# Patient Record
Sex: MALE | Race: BLACK OR AFRICAN AMERICAN | Employment: FULL TIME | URBAN - NONMETROPOLITAN AREA
[De-identification: names, ages, dates, MRNs, and addresses within clinical notes are randomized per-mention and may not be internally consistent; named-entity substitution may affect disease eponyms.]

---

## 2017-01-10 ENCOUNTER — OFFICE VISIT (OUTPATIENT)
Dept: CARDIOLOGY | Age: 47
End: 2017-01-10

## 2017-01-10 ENCOUNTER — PROCEDURE VISIT (OUTPATIENT)
Dept: CARDIOLOGY | Age: 47
End: 2017-01-10

## 2017-01-10 VITALS
SYSTOLIC BLOOD PRESSURE: 142 MMHG | WEIGHT: 222 LBS | HEIGHT: 70 IN | DIASTOLIC BLOOD PRESSURE: 78 MMHG | BODY MASS INDEX: 31.78 KG/M2 | HEART RATE: 93 BPM

## 2017-01-10 DIAGNOSIS — Z01.818 PRE-OP EVALUATION: ICD-10-CM

## 2017-01-10 DIAGNOSIS — E78.5 DYSLIPIDEMIA: ICD-10-CM

## 2017-01-10 DIAGNOSIS — Z95.810 S/P ICD (INTERNAL CARDIAC DEFIBRILLATOR) PROCEDURE: ICD-10-CM

## 2017-01-10 DIAGNOSIS — Z72.0 TOBACCO ABUSE: ICD-10-CM

## 2017-01-10 DIAGNOSIS — R06.02 SOB (SHORTNESS OF BREATH) ON EXERTION: ICD-10-CM

## 2017-01-10 DIAGNOSIS — I25.10 CORONARY ARTERY DISEASE INVOLVING NATIVE CORONARY ARTERY OF NATIVE HEART, ANGINA PRESENCE UNSPECIFIED: ICD-10-CM

## 2017-01-10 DIAGNOSIS — Z95.810 S/P ICD (INTERNAL CARDIAC DEFIBRILLATOR) PROCEDURE: Primary | ICD-10-CM

## 2017-01-10 DIAGNOSIS — I25.5 ISCHEMIC CARDIOMYOPATHY: ICD-10-CM

## 2017-01-10 DIAGNOSIS — R07.9 CHEST PAIN AT REST: Primary | ICD-10-CM

## 2017-01-10 PROCEDURE — 93282 PRGRMG EVAL IMPLANTABLE DFB: CPT | Performed by: INTERNAL MEDICINE

## 2017-01-10 PROCEDURE — 93000 ELECTROCARDIOGRAM COMPLETE: CPT | Performed by: INTERNAL MEDICINE

## 2017-01-10 PROCEDURE — 99215 OFFICE O/P EST HI 40 MIN: CPT | Performed by: INTERNAL MEDICINE

## 2017-01-10 RX ORDER — METOPROLOL SUCCINATE 50 MG/1
50 TABLET, EXTENDED RELEASE ORAL 2 TIMES DAILY
Qty: 60 TABLET | Refills: 3 | Status: SHIPPED | OUTPATIENT
Start: 2017-01-10 | End: 2017-01-12

## 2017-01-10 RX ORDER — LISINOPRIL 10 MG/1
10 TABLET ORAL DAILY
Qty: 30 TABLET | Refills: 6 | Status: SHIPPED | OUTPATIENT
Start: 2017-01-10 | End: 2017-06-26 | Stop reason: SDUPTHER

## 2017-01-10 RX ORDER — PRASUGREL 10 MG/1
10 TABLET, FILM COATED ORAL DAILY
Qty: 30 TABLET | Refills: 6 | Status: SHIPPED | OUTPATIENT
Start: 2017-01-10 | End: 2017-09-15 | Stop reason: SDUPTHER

## 2017-01-10 RX ORDER — ISOSORBIDE MONONITRATE 60 MG/1
60 TABLET, EXTENDED RELEASE ORAL DAILY
Qty: 30 TABLET | Refills: 3 | Status: SHIPPED | OUTPATIENT
Start: 2017-01-10 | End: 2017-06-26 | Stop reason: ALTCHOICE

## 2017-01-12 RX ORDER — METOPROLOL TARTRATE 50 MG/1
50 TABLET, FILM COATED ORAL 2 TIMES DAILY
Qty: 60 TABLET | Refills: 0 | Status: SHIPPED | OUTPATIENT
Start: 2017-01-12 | End: 2017-06-26 | Stop reason: SDUPTHER

## 2017-01-12 RX ORDER — METOPROLOL TARTRATE 50 MG/1
50 TABLET, FILM COATED ORAL 2 TIMES DAILY
COMMUNITY
End: 2017-01-12 | Stop reason: SDUPTHER

## 2017-01-30 ENCOUNTER — OFFICE VISIT (OUTPATIENT)
Dept: CARDIOLOGY | Age: 47
End: 2017-01-30

## 2017-01-30 VITALS
DIASTOLIC BLOOD PRESSURE: 66 MMHG | SYSTOLIC BLOOD PRESSURE: 104 MMHG | WEIGHT: 220 LBS | BODY MASS INDEX: 32.58 KG/M2 | HEIGHT: 69 IN | HEART RATE: 80 BPM

## 2017-01-30 DIAGNOSIS — E78.5 DYSLIPIDEMIA: ICD-10-CM

## 2017-01-30 DIAGNOSIS — I25.5 ISCHEMIC CARDIOMYOPATHY: ICD-10-CM

## 2017-01-30 DIAGNOSIS — Z72.0 TOBACCO ABUSE: ICD-10-CM

## 2017-01-30 DIAGNOSIS — I25.10 CORONARY ARTERY DISEASE INVOLVING NATIVE CORONARY ARTERY OF NATIVE HEART, ANGINA PRESENCE UNSPECIFIED: ICD-10-CM

## 2017-01-30 DIAGNOSIS — R07.9 CHEST PAIN AT REST: Primary | ICD-10-CM

## 2017-01-30 DIAGNOSIS — Z01.818 PRE-OP EVALUATION: ICD-10-CM

## 2017-01-30 DIAGNOSIS — R06.02 SOB (SHORTNESS OF BREATH) ON EXERTION: ICD-10-CM

## 2017-01-30 DIAGNOSIS — Z95.810 S/P ICD (INTERNAL CARDIAC DEFIBRILLATOR) PROCEDURE: ICD-10-CM

## 2017-01-30 PROCEDURE — 99213 OFFICE O/P EST LOW 20 MIN: CPT | Performed by: INTERNAL MEDICINE

## 2017-02-14 ENCOUNTER — TELEPHONE (OUTPATIENT)
Dept: CARDIOLOGY | Age: 47
End: 2017-02-14

## 2017-02-22 ENCOUNTER — TELEPHONE (OUTPATIENT)
Dept: CARDIOLOGY | Age: 47
End: 2017-02-22

## 2017-03-03 ENCOUNTER — TELEPHONE (OUTPATIENT)
Dept: CARDIOLOGY | Age: 47
End: 2017-03-03

## 2017-03-17 ENCOUNTER — PROCEDURE VISIT (OUTPATIENT)
Dept: CARDIOLOGY | Age: 47
End: 2017-03-17

## 2017-03-17 DIAGNOSIS — I25.5 ISCHEMIC CARDIOMYOPATHY: Primary | ICD-10-CM

## 2017-03-17 DIAGNOSIS — Z95.810 S/P ICD (INTERNAL CARDIAC DEFIBRILLATOR) PROCEDURE: ICD-10-CM

## 2017-03-17 PROCEDURE — 93297 REM INTERROG DEV EVAL ICPMS: CPT | Performed by: INTERNAL MEDICINE

## 2017-05-03 ENCOUNTER — TELEPHONE (OUTPATIENT)
Dept: CARDIOLOGY | Age: 47
End: 2017-05-03

## 2017-05-16 ENCOUNTER — PROCEDURE VISIT (OUTPATIENT)
Dept: CARDIOLOGY | Age: 47
End: 2017-05-16

## 2017-05-16 DIAGNOSIS — Z95.810 S/P ICD (INTERNAL CARDIAC DEFIBRILLATOR) PROCEDURE: Primary | ICD-10-CM

## 2017-05-16 PROCEDURE — 93295 DEV INTERROG REMOTE 1/2/MLT: CPT | Performed by: INTERNAL MEDICINE

## 2017-05-16 PROCEDURE — 93296 REM INTERROG EVL PM/IDS: CPT | Performed by: INTERNAL MEDICINE

## 2017-06-12 ENCOUNTER — PROCEDURE VISIT (OUTPATIENT)
Dept: CARDIOLOGY | Age: 47
End: 2017-06-12

## 2017-06-12 DIAGNOSIS — Z95.810 S/P ICD (INTERNAL CARDIAC DEFIBRILLATOR) PROCEDURE: Primary | ICD-10-CM

## 2017-06-26 ENCOUNTER — OFFICE VISIT (OUTPATIENT)
Dept: CARDIOLOGY | Age: 47
End: 2017-06-26

## 2017-06-26 VITALS
WEIGHT: 215.8 LBS | DIASTOLIC BLOOD PRESSURE: 78 MMHG | HEIGHT: 70 IN | SYSTOLIC BLOOD PRESSURE: 126 MMHG | BODY MASS INDEX: 30.9 KG/M2 | HEART RATE: 78 BPM

## 2017-06-26 DIAGNOSIS — R06.02 SOB (SHORTNESS OF BREATH) ON EXERTION: ICD-10-CM

## 2017-06-26 DIAGNOSIS — I25.10 CORONARY ARTERY DISEASE INVOLVING NATIVE CORONARY ARTERY OF NATIVE HEART, ANGINA PRESENCE UNSPECIFIED: Primary | ICD-10-CM

## 2017-06-26 DIAGNOSIS — R07.9 CHEST PAIN AT REST: ICD-10-CM

## 2017-06-26 DIAGNOSIS — Z98.890 S/P CARDIAC CATH: ICD-10-CM

## 2017-06-26 DIAGNOSIS — I25.5 ISCHEMIC CARDIOMYOPATHY: ICD-10-CM

## 2017-06-26 DIAGNOSIS — I20.8 STABLE ANGINA (HCC): ICD-10-CM

## 2017-06-26 DIAGNOSIS — Z95.810 S/P ICD (INTERNAL CARDIAC DEFIBRILLATOR) PROCEDURE: ICD-10-CM

## 2017-06-26 PROBLEM — I20.89 STABLE ANGINA: Status: ACTIVE | Noted: 2017-06-26

## 2017-06-26 PROCEDURE — 99214 OFFICE O/P EST MOD 30 MIN: CPT | Performed by: INTERNAL MEDICINE

## 2017-06-26 RX ORDER — ISOSORBIDE MONONITRATE 30 MG/1
30 TABLET, EXTENDED RELEASE ORAL DAILY
COMMUNITY
End: 2017-06-26

## 2017-06-26 RX ORDER — RANOLAZINE 500 MG/1
500 TABLET, EXTENDED RELEASE ORAL 2 TIMES DAILY
Qty: 180 TABLET | Refills: 1 | Status: SHIPPED | OUTPATIENT
Start: 2017-06-26 | End: 2017-09-15 | Stop reason: SDUPTHER

## 2017-06-26 RX ORDER — TRIAMTERENE AND HYDROCHLOROTHIAZIDE 37.5; 25 MG/1; MG/1
1 TABLET ORAL EVERY MORNING
COMMUNITY
End: 2017-06-26

## 2017-06-26 RX ORDER — METOPROLOL TARTRATE 50 MG/1
50 TABLET, FILM COATED ORAL 2 TIMES DAILY
Qty: 180 TABLET | Refills: 1 | Status: SHIPPED | OUTPATIENT
Start: 2017-06-26 | End: 2017-09-15 | Stop reason: SDUPTHER

## 2017-06-26 RX ORDER — ISOSORBIDE MONONITRATE 120 MG/1
120 TABLET, EXTENDED RELEASE ORAL DAILY
Qty: 90 TABLET | Refills: 2 | Status: SHIPPED | OUTPATIENT
Start: 2017-06-26 | End: 2017-09-15 | Stop reason: SDUPTHER

## 2017-06-26 RX ORDER — OMEPRAZOLE 40 MG/1
40 CAPSULE, DELAYED RELEASE ORAL DAILY
Status: ON HOLD | COMMUNITY
End: 2018-08-21

## 2017-06-26 RX ORDER — LISINOPRIL 10 MG/1
10 TABLET ORAL DAILY
Qty: 90 TABLET | Refills: 1 | Status: SHIPPED | OUTPATIENT
Start: 2017-06-26 | End: 2017-09-15 | Stop reason: SDUPTHER

## 2017-06-26 RX ORDER — PANTOPRAZOLE SODIUM 40 MG/1
40 TABLET, DELAYED RELEASE ORAL DAILY
Qty: 90 TABLET | Refills: 1 | Status: SHIPPED | OUTPATIENT
Start: 2017-06-26 | End: 2017-09-15 | Stop reason: SDUPTHER

## 2017-06-26 RX ORDER — NAPROXEN 500 MG/1
500 TABLET ORAL 2 TIMES DAILY PRN
Status: ON HOLD | COMMUNITY
End: 2018-08-20

## 2017-07-06 ENCOUNTER — TELEPHONE (OUTPATIENT)
Dept: CARDIOLOGY | Age: 47
End: 2017-07-06

## 2017-07-06 ENCOUNTER — PROCEDURE VISIT (OUTPATIENT)
Dept: CARDIOLOGY | Age: 47
End: 2017-07-06

## 2017-07-06 DIAGNOSIS — I25.5 ISCHEMIC CARDIOMYOPATHY: Primary | ICD-10-CM

## 2017-07-06 PROCEDURE — 93297 REM INTERROG DEV EVAL ICPMS: CPT | Performed by: INTERNAL MEDICINE

## 2017-07-17 ENCOUNTER — OFFICE VISIT (OUTPATIENT)
Dept: CARDIOLOGY CLINIC | Age: 47
End: 2017-07-17
Payer: MEDICAID

## 2017-07-17 VITALS
BODY MASS INDEX: 31.07 KG/M2 | SYSTOLIC BLOOD PRESSURE: 124 MMHG | HEART RATE: 88 BPM | HEIGHT: 70 IN | DIASTOLIC BLOOD PRESSURE: 76 MMHG | WEIGHT: 217 LBS

## 2017-07-17 DIAGNOSIS — Z98.890 S/P CARDIAC CATH: ICD-10-CM

## 2017-07-17 DIAGNOSIS — Z95.810 S/P ICD (INTERNAL CARDIAC DEFIBRILLATOR) PROCEDURE: ICD-10-CM

## 2017-07-17 DIAGNOSIS — E78.5 DYSLIPIDEMIA: ICD-10-CM

## 2017-07-17 DIAGNOSIS — R06.02 SOB (SHORTNESS OF BREATH) ON EXERTION: ICD-10-CM

## 2017-07-17 DIAGNOSIS — I25.10 CORONARY ARTERY DISEASE INVOLVING NATIVE CORONARY ARTERY OF NATIVE HEART, ANGINA PRESENCE UNSPECIFIED: Primary | ICD-10-CM

## 2017-07-17 DIAGNOSIS — I25.5 ISCHEMIC CARDIOMYOPATHY: ICD-10-CM

## 2017-07-17 PROCEDURE — 99213 OFFICE O/P EST LOW 20 MIN: CPT | Performed by: INTERNAL MEDICINE

## 2017-08-11 ENCOUNTER — PROCEDURE VISIT (OUTPATIENT)
Dept: CARDIOLOGY CLINIC | Age: 47
End: 2017-08-11

## 2017-08-11 DIAGNOSIS — Z95.810 S/P ICD (INTERNAL CARDIAC DEFIBRILLATOR) PROCEDURE: Primary | ICD-10-CM

## 2017-08-28 ENCOUNTER — TELEPHONE (OUTPATIENT)
Dept: CARDIOLOGY CLINIC | Age: 47
End: 2017-08-28

## 2017-08-28 DIAGNOSIS — I25.5 ISCHEMIC CARDIOMYOPATHY: Primary | ICD-10-CM

## 2017-09-01 ENCOUNTER — TELEPHONE (OUTPATIENT)
Dept: CARDIOLOGY CLINIC | Age: 47
End: 2017-09-01

## 2017-09-06 ENCOUNTER — HOSPITAL ENCOUNTER (OUTPATIENT)
Dept: NON INVASIVE DIAGNOSTICS | Age: 47
Discharge: HOME | End: 2017-09-06
Payer: MEDICAID

## 2017-09-06 DIAGNOSIS — I25.5 ISCHEMIC CARDIOMYOPATHY: ICD-10-CM

## 2017-09-06 PROCEDURE — 93307 TTE W/O DOPPLER COMPLETE: CPT

## 2017-09-15 ENCOUNTER — OFFICE VISIT (OUTPATIENT)
Dept: CARDIOLOGY CLINIC | Age: 47
End: 2017-09-15
Payer: MEDICAID

## 2017-09-15 ENCOUNTER — HOSPITAL ENCOUNTER (EMERGENCY)
Age: 47
Discharge: LEFT BEFORE DISCHARGE | End: 2017-09-15
Payer: MEDICAID

## 2017-09-15 ENCOUNTER — APPOINTMENT (OUTPATIENT)
Dept: CT IMAGING | Age: 47
End: 2017-09-15
Payer: MEDICAID

## 2017-09-15 VITALS
TEMPERATURE: 98.7 F | HEART RATE: 74 BPM | OXYGEN SATURATION: 99 % | RESPIRATION RATE: 18 BRPM | BODY MASS INDEX: 30.21 KG/M2 | HEIGHT: 70 IN | WEIGHT: 211 LBS | DIASTOLIC BLOOD PRESSURE: 97 MMHG | SYSTOLIC BLOOD PRESSURE: 141 MMHG

## 2017-09-15 VITALS
SYSTOLIC BLOOD PRESSURE: 130 MMHG | HEART RATE: 80 BPM | BODY MASS INDEX: 31.25 KG/M2 | WEIGHT: 211 LBS | HEIGHT: 69 IN | DIASTOLIC BLOOD PRESSURE: 82 MMHG

## 2017-09-15 DIAGNOSIS — R07.9 CHEST PAIN AT REST: ICD-10-CM

## 2017-09-15 DIAGNOSIS — I25.5 ISCHEMIC CARDIOMYOPATHY: ICD-10-CM

## 2017-09-15 DIAGNOSIS — E78.5 DYSLIPIDEMIA: ICD-10-CM

## 2017-09-15 DIAGNOSIS — I25.10 CORONARY ARTERY DISEASE INVOLVING NATIVE CORONARY ARTERY OF NATIVE HEART WITHOUT ANGINA PECTORIS: Primary | ICD-10-CM

## 2017-09-15 DIAGNOSIS — Z98.890 S/P CARDIAC CATH: ICD-10-CM

## 2017-09-15 DIAGNOSIS — R51.9 ACUTE NONINTRACTABLE HEADACHE, UNSPECIFIED HEADACHE TYPE: Primary | ICD-10-CM

## 2017-09-15 DIAGNOSIS — Z95.810 S/P ICD (INTERNAL CARDIAC DEFIBRILLATOR) PROCEDURE: ICD-10-CM

## 2017-09-15 DIAGNOSIS — R06.02 SOB (SHORTNESS OF BREATH) ON EXERTION: ICD-10-CM

## 2017-09-15 LAB
ANION GAP SERPL CALCULATED.3IONS-SCNC: 13 MEQ/L (ref 8–16)
BASOPHILS # BLD: 0.6 %
BASOPHILS ABSOLUTE: 0.1 THOU/MM3 (ref 0–0.1)
BUN BLDV-MCNC: 10 MG/DL (ref 7–22)
CALCIUM SERPL-MCNC: 10.4 MG/DL (ref 8.5–10.5)
CHLORIDE BLD-SCNC: 98 MEQ/L (ref 98–111)
CO2: 28 MEQ/L (ref 23–33)
CREAT SERPL-MCNC: 0.9 MG/DL (ref 0.4–1.2)
EOSINOPHIL # BLD: 0.7 %
EOSINOPHILS ABSOLUTE: 0.1 THOU/MM3 (ref 0–0.4)
GFR SERPL CREATININE-BSD FRML MDRD: > 90 ML/MIN/1.73M2
GLUCOSE BLD-MCNC: 166 MG/DL (ref 70–108)
HCT VFR BLD CALC: 45.9 % (ref 42–52)
HEMOGLOBIN: 15.7 GM/DL (ref 14–18)
LYMPHOCYTES # BLD: 39.5 %
LYMPHOCYTES ABSOLUTE: 4.9 THOU/MM3 (ref 1–4.8)
MCH RBC QN AUTO: 30.6 PG (ref 27–31)
MCHC RBC AUTO-ENTMCNC: 34.3 GM/DL (ref 33–37)
MCV RBC AUTO: 89.1 FL (ref 80–94)
MONOCYTES # BLD: 5.1 %
MONOCYTES ABSOLUTE: 0.6 THOU/MM3 (ref 0.4–1.3)
NUCLEATED RED BLOOD CELLS: 0 /100 WBC
OSMOLALITY CALCULATION: 280.3 MOSMOL/KG (ref 275–300)
PDW BLD-RTO: 14 % (ref 11.5–14.5)
PLATELET # BLD: 246 THOU/MM3 (ref 130–400)
PMV BLD AUTO: 8.7 MCM (ref 7.4–10.4)
POTASSIUM SERPL-SCNC: 5.2 MEQ/L (ref 3.5–5.2)
RBC # BLD: 5.15 MILL/MM3 (ref 4.7–6.1)
RBC # BLD: NORMAL 10*6/UL
SEG NEUTROPHILS: 54.1 %
SEGMENTED NEUTROPHILS ABSOLUTE COUNT: 6.8 THOU/MM3 (ref 1.8–7.7)
SODIUM BLD-SCNC: 139 MEQ/L (ref 135–145)
WBC # BLD: 12.5 THOU/MM3 (ref 4.8–10.8)

## 2017-09-15 PROCEDURE — 99284 EMERGENCY DEPT VISIT MOD MDM: CPT

## 2017-09-15 PROCEDURE — 80048 BASIC METABOLIC PNL TOTAL CA: CPT

## 2017-09-15 PROCEDURE — 36415 COLL VENOUS BLD VENIPUNCTURE: CPT

## 2017-09-15 PROCEDURE — 99215 OFFICE O/P EST HI 40 MIN: CPT | Performed by: INTERNAL MEDICINE

## 2017-09-15 PROCEDURE — 6370000000 HC RX 637 (ALT 250 FOR IP): Performed by: PHYSICIAN ASSISTANT

## 2017-09-15 PROCEDURE — 85025 COMPLETE CBC W/AUTO DIFF WBC: CPT

## 2017-09-15 RX ORDER — ASPIRIN 81 MG/1
81 TABLET ORAL DAILY
Qty: 90 TABLET | Refills: 3 | Status: SHIPPED | OUTPATIENT
Start: 2017-09-15

## 2017-09-15 RX ORDER — CYCLOBENZAPRINE HCL 10 MG
10 TABLET ORAL 3 TIMES DAILY PRN
Status: DISCONTINUED | OUTPATIENT
Start: 2017-09-15 | End: 2017-09-15 | Stop reason: HOSPADM

## 2017-09-15 RX ORDER — METOPROLOL TARTRATE 50 MG/1
50 TABLET, FILM COATED ORAL 2 TIMES DAILY
Qty: 180 TABLET | Refills: 3 | Status: ON HOLD | OUTPATIENT
Start: 2017-09-15 | End: 2018-08-21

## 2017-09-15 RX ORDER — PANTOPRAZOLE SODIUM 40 MG/1
40 TABLET, DELAYED RELEASE ORAL DAILY
Qty: 90 TABLET | Refills: 3 | Status: ON HOLD | OUTPATIENT
Start: 2017-09-15 | End: 2018-01-13 | Stop reason: HOSPADM

## 2017-09-15 RX ORDER — FLUTICASONE PROPIONATE 50 MCG
2 SPRAY, SUSPENSION (ML) NASAL DAILY
COMMUNITY
End: 2021-04-06

## 2017-09-15 RX ORDER — DIPHENHYDRAMINE HCL 25 MG
25 TABLET ORAL ONCE
Status: COMPLETED | OUTPATIENT
Start: 2017-09-15 | End: 2017-09-15

## 2017-09-15 RX ORDER — LISINOPRIL 5 MG/1
5 TABLET ORAL DAILY
Qty: 90 TABLET | Refills: 2 | Status: ON HOLD | OUTPATIENT
Start: 2017-09-15 | End: 2018-08-21

## 2017-09-15 RX ORDER — 0.9 % SODIUM CHLORIDE 0.9 %
1000 INTRAVENOUS SOLUTION INTRAVENOUS ONCE
Status: DISCONTINUED | OUTPATIENT
Start: 2017-09-15 | End: 2017-09-15 | Stop reason: HOSPADM

## 2017-09-15 RX ORDER — ACETAMINOPHEN 500 MG
1000 TABLET ORAL ONCE
Status: COMPLETED | OUTPATIENT
Start: 2017-09-15 | End: 2017-09-15

## 2017-09-15 RX ORDER — ISOSORBIDE MONONITRATE 120 MG/1
120 TABLET, EXTENDED RELEASE ORAL DAILY
Qty: 90 TABLET | Refills: 2 | Status: ON HOLD | OUTPATIENT
Start: 2017-09-15 | End: 2018-08-21 | Stop reason: HOSPADM

## 2017-09-15 RX ORDER — RANOLAZINE 1000 MG/1
1000 TABLET, EXTENDED RELEASE ORAL 2 TIMES DAILY
Qty: 90 TABLET | Refills: 1 | Status: ON HOLD | OUTPATIENT
Start: 2017-09-15 | End: 2018-08-21

## 2017-09-15 RX ORDER — TRIAMTERENE AND HYDROCHLOROTHIAZIDE 37.5; 25 MG/1; MG/1
0.5 TABLET ORAL DAILY
Qty: 45 TABLET | Refills: 1 | Status: ON HOLD | OUTPATIENT
Start: 2017-09-15 | End: 2018-08-21

## 2017-09-15 RX ORDER — PRASUGREL 10 MG/1
10 TABLET, FILM COATED ORAL DAILY
Qty: 90 TABLET | Refills: 3 | Status: ON HOLD | OUTPATIENT
Start: 2017-09-15 | End: 2018-08-21

## 2017-09-15 RX ORDER — ONDANSETRON 2 MG/ML
4 INJECTION INTRAMUSCULAR; INTRAVENOUS ONCE
Status: DISCONTINUED | OUTPATIENT
Start: 2017-09-15 | End: 2017-09-15 | Stop reason: HOSPADM

## 2017-09-15 RX ADMIN — DIPHENHYDRAMINE HCL 25 MG: 25 TABLET ORAL at 15:45

## 2017-09-15 RX ADMIN — CYCLOBENZAPRINE HYDROCHLORIDE 10 MG: 10 TABLET, FILM COATED ORAL at 15:45

## 2017-09-15 RX ADMIN — ACETAMINOPHEN 1000 MG: 500 TABLET ORAL at 15:45

## 2017-09-15 ASSESSMENT — ENCOUNTER SYMPTOMS
RHINORRHEA: 0
COUGH: 0
WHEEZING: 0
PHOTOPHOBIA: 0
SHORTNESS OF BREATH: 0
ABDOMINAL PAIN: 0
COLOR CHANGE: 0
SINUS PRESSURE: 0
BACK PAIN: 0
DIARRHEA: 0
NAUSEA: 0
CONSTIPATION: 0
SORE THROAT: 0
VOMITING: 0
EYE PAIN: 0

## 2017-09-15 ASSESSMENT — PAIN SCALES - GENERAL
PAINLEVEL_OUTOF10: 6
PAINLEVEL_OUTOF10: 10
PAINLEVEL_OUTOF10: 10

## 2017-09-15 ASSESSMENT — PAIN DESCRIPTION - LOCATION
LOCATION: HEAD
LOCATION: HEAD;NECK

## 2017-09-15 ASSESSMENT — PAIN DESCRIPTION - PAIN TYPE
TYPE: ACUTE PAIN
TYPE: ACUTE PAIN

## 2017-09-15 ASSESSMENT — PAIN DESCRIPTION - DESCRIPTORS
DESCRIPTORS: ACHING;THROBBING
DESCRIPTORS: ACHING

## 2017-09-15 ASSESSMENT — PAIN DESCRIPTION - PROGRESSION: CLINICAL_PROGRESSION: GRADUALLY IMPROVING

## 2017-09-19 ENCOUNTER — PROCEDURE VISIT (OUTPATIENT)
Dept: CARDIOLOGY CLINIC | Age: 47
End: 2017-09-19
Payer: MEDICAID

## 2017-09-19 DIAGNOSIS — Z95.810 S/P ICD (INTERNAL CARDIAC DEFIBRILLATOR) PROCEDURE: Primary | ICD-10-CM

## 2017-09-19 DIAGNOSIS — I25.5 ISCHEMIC CARDIOMYOPATHY: ICD-10-CM

## 2017-09-19 PROCEDURE — 93297 REM INTERROG DEV EVAL ICPMS: CPT | Performed by: INTERNAL MEDICINE

## 2017-09-25 ENCOUNTER — TELEPHONE (OUTPATIENT)
Dept: CARDIOLOGY CLINIC | Age: 47
End: 2017-09-25

## 2017-10-20 ENCOUNTER — PROCEDURE VISIT (OUTPATIENT)
Dept: CARDIOLOGY CLINIC | Age: 47
End: 2017-10-20
Payer: MEDICAID

## 2017-10-20 DIAGNOSIS — I25.5 ISCHEMIC CARDIOMYOPATHY: Primary | ICD-10-CM

## 2017-10-20 PROCEDURE — 93297 REM INTERROG DEV EVAL ICPMS: CPT | Performed by: INTERNAL MEDICINE

## 2017-12-01 ENCOUNTER — TELEPHONE (OUTPATIENT)
Dept: CARDIOLOGY CLINIC | Age: 47
End: 2017-12-01

## 2017-12-29 ENCOUNTER — PROCEDURE VISIT (OUTPATIENT)
Dept: CARDIOLOGY CLINIC | Age: 47
End: 2017-12-29
Payer: MEDICAID

## 2017-12-29 DIAGNOSIS — Z95.810 S/P ICD (INTERNAL CARDIAC DEFIBRILLATOR) PROCEDURE: Primary | ICD-10-CM

## 2017-12-29 PROCEDURE — 93295 DEV INTERROG REMOTE 1/2/MLT: CPT | Performed by: INTERNAL MEDICINE

## 2017-12-29 PROCEDURE — 93296 REM INTERROG EVL PM/IDS: CPT | Performed by: INTERNAL MEDICINE

## 2017-12-29 NOTE — PROGRESS NOTES
DR GUPTA PT  [employer] SINGLE ICD  BATTERY 9.2 YRS REMAINING  RV IMPEDENCE 456  RV 69  VVI 40  V PACED <0.1%  SEVERAL NS SVT EPISDODES   OPTIVOL WNL

## 2018-01-12 ENCOUNTER — APPOINTMENT (OUTPATIENT)
Dept: GENERAL RADIOLOGY | Age: 48
DRG: 198 | End: 2018-01-12
Payer: MEDICAID

## 2018-01-12 ENCOUNTER — HOSPITAL ENCOUNTER (INPATIENT)
Age: 48
LOS: 1 days | Discharge: HOME | DRG: 198 | End: 2018-01-13
Attending: INTERNAL MEDICINE | Admitting: INTERNAL MEDICINE
Payer: MEDICAID

## 2018-01-12 DIAGNOSIS — R07.89 OTHER CHEST PAIN: Primary | ICD-10-CM

## 2018-01-12 LAB
ALBUMIN SERPL-MCNC: 4.7 G/DL (ref 3.5–5.1)
ALP BLD-CCNC: 77 U/L (ref 38–126)
ALT SERPL-CCNC: 21 U/L (ref 11–66)
AMPHETAMINE+METHAMPHETAMINE URINE SCREEN: NEGATIVE
ANISOCYTOSIS: ABNORMAL
AST SERPL-CCNC: 17 U/L (ref 5–40)
BACTERIA: ABNORMAL
BARBITURATE QUANTITATIVE URINE: NEGATIVE
BASOPHILS # BLD: 0.5 %
BASOPHILS ABSOLUTE: 0.1 THOU/MM3 (ref 0–0.1)
BENZODIAZEPINE QUANTITATIVE URINE: NEGATIVE
BILIRUB SERPL-MCNC: 0.6 MG/DL (ref 0.3–1.2)
BILIRUBIN URINE: NEGATIVE
BLOOD, URINE: NEGATIVE
BUN BLDV-MCNC: 10 MG/DL (ref 7–22)
CALCIUM SERPL-MCNC: 9.9 MG/DL (ref 8.5–10.5)
CANNABINOID QUANTITATIVE URINE: POSITIVE
CASTS: ABNORMAL /LPF
CHARACTER, URINE: CLEAR
CHLORIDE BLD-SCNC: 98 MEQ/L (ref 98–111)
CO2: 30 MEQ/L (ref 23–33)
COCAINE METABOLITE QUANTITATIVE URINE: NEGATIVE
COLOR: YELLOW
CREAT SERPL-MCNC: 1 MG/DL (ref 0.4–1.2)
CRYSTALS: ABNORMAL
EOSINOPHIL # BLD: 1.6 %
EOSINOPHILS ABSOLUTE: 0.2 THOU/MM3 (ref 0–0.4)
EPITHELIAL CELLS, UA: ABNORMAL /HPF
GFR SERPL CREATININE-BSD FRML MDRD: > 90 ML/MIN/1.73M2
GLUCOSE BLD-MCNC: 119 MG/DL (ref 70–108)
GLUCOSE, URINE: 100 MG/DL
HCT VFR BLD CALC: 45.4 % (ref 42–52)
HEMOGLOBIN: 15.3 GM/DL (ref 14–18)
KETONES, URINE: NEGATIVE
LEUKOCYTE ESTERASE, URINE: NEGATIVE
LYMPHOCYTES # BLD: 36.2 %
LYMPHOCYTES ABSOLUTE: 4.1 THOU/MM3 (ref 1–4.8)
MCH RBC QN AUTO: 30.9 PG (ref 27–31)
MCHC RBC AUTO-ENTMCNC: 33.7 GM/DL (ref 33–37)
MCV RBC AUTO: 91.9 FL (ref 80–94)
MONOCYTES # BLD: 6.4 %
MONOCYTES ABSOLUTE: 0.7 THOU/MM3 (ref 0.4–1.3)
NITRITE, URINE: NEGATIVE
NUCLEATED RED BLOOD CELLS: 0 /100 WBC
OPIATES, URINE: NEGATIVE
OSMOLALITY CALCULATION: 279.6 MOSMOL/KG (ref 275–300)
OXYCODONE: NEGATIVE
PDW BLD-RTO: 16 % (ref 11.5–14.5)
PH UA: 6.5
PHENCYCLIDINE QUANTITATIVE URINE: NEGATIVE
PLATELET # BLD: 253 THOU/MM3 (ref 130–400)
PMV BLD AUTO: 8 MCM (ref 7.4–10.4)
POTASSIUM REFLEX MAGNESIUM: 4.2 MEQ/L (ref 3.5–5.2)
PRO-BNP: 176.7 PG/ML (ref 0–450)
PROTEIN UA: NEGATIVE MG/DL
RBC # BLD: 4.94 MILL/MM3 (ref 4.7–6.1)
RBC URINE: ABNORMAL /HPF
SEG NEUTROPHILS: 55.3 %
SEGMENTED NEUTROPHILS ABSOLUTE COUNT: 6.3 THOU/MM3 (ref 1.8–7.7)
SODIUM BLD-SCNC: 140 MEQ/L (ref 135–145)
SPECIFIC GRAVITY UA: 1.02 (ref 1–1.03)
TOTAL PROTEIN: 7.1 G/DL (ref 6.1–8)
TROPONIN T: < 0.01 NG/ML
UROBILINOGEN, URINE: 1 EU/DL
WBC # BLD: 11.4 THOU/MM3 (ref 4.8–10.8)
WBC UA: ABNORMAL /HPF

## 2018-01-12 PROCEDURE — 96365 THER/PROPH/DIAG IV INF INIT: CPT

## 2018-01-12 PROCEDURE — 71046 X-RAY EXAM CHEST 2 VIEWS: CPT

## 2018-01-12 PROCEDURE — 80307 DRUG TEST PRSMV CHEM ANLYZR: CPT

## 2018-01-12 PROCEDURE — 99285 EMERGENCY DEPT VISIT HI MDM: CPT

## 2018-01-12 PROCEDURE — 36415 COLL VENOUS BLD VENIPUNCTURE: CPT

## 2018-01-12 PROCEDURE — 80053 COMPREHEN METABOLIC PANEL: CPT

## 2018-01-12 PROCEDURE — 6370000000 HC RX 637 (ALT 250 FOR IP): Performed by: PHYSICIAN ASSISTANT

## 2018-01-12 PROCEDURE — 84484 ASSAY OF TROPONIN QUANT: CPT

## 2018-01-12 PROCEDURE — G0378 HOSPITAL OBSERVATION PER HR: HCPCS

## 2018-01-12 PROCEDURE — 93005 ELECTROCARDIOGRAM TRACING: CPT

## 2018-01-12 PROCEDURE — 81001 URINALYSIS AUTO W/SCOPE: CPT

## 2018-01-12 PROCEDURE — 85025 COMPLETE CBC W/AUTO DIFF WBC: CPT

## 2018-01-12 PROCEDURE — 83880 ASSAY OF NATRIURETIC PEPTIDE: CPT

## 2018-01-12 PROCEDURE — 2500000003 HC RX 250 WO HCPCS: Performed by: PHYSICIAN ASSISTANT

## 2018-01-12 RX ORDER — NITROGLYCERIN 0.4 MG/1
0.4 TABLET SUBLINGUAL EVERY 5 MIN PRN
Status: DISCONTINUED | OUTPATIENT
Start: 2018-01-12 | End: 2018-01-13 | Stop reason: HOSPADM

## 2018-01-12 RX ORDER — NITROGLYCERIN 20 MG/100ML
5 INJECTION INTRAVENOUS CONTINUOUS
Status: DISCONTINUED | OUTPATIENT
Start: 2018-01-12 | End: 2018-01-13

## 2018-01-12 RX ADMIN — NITROGLYCERIN 0.4 MG: 0.4 TABLET SUBLINGUAL at 21:08

## 2018-01-12 RX ADMIN — NITROGLYCERIN 5 MCG/MIN: 20 INJECTION INTRAVENOUS at 23:26

## 2018-01-12 RX ADMIN — NITROGLYCERIN 0.4 MG: 0.4 TABLET SUBLINGUAL at 21:13

## 2018-01-12 RX ADMIN — NITROGLYCERIN 0.4 MG: 0.4 TABLET SUBLINGUAL at 21:19

## 2018-01-12 ASSESSMENT — PAIN DESCRIPTION - DESCRIPTORS: DESCRIPTORS: SHOOTING;TIGHTNESS

## 2018-01-12 ASSESSMENT — ENCOUNTER SYMPTOMS
BACK PAIN: 0
WHEEZING: 0
DIARRHEA: 0
VOMITING: 0
SHORTNESS OF BREATH: 1
EYE REDNESS: 0
RHINORRHEA: 0
SORE THROAT: 0
ABDOMINAL PAIN: 0
COUGH: 0
NAUSEA: 0
EYE DISCHARGE: 0

## 2018-01-12 ASSESSMENT — PAIN SCALES - GENERAL
PAINLEVEL_OUTOF10: 10
PAINLEVEL_OUTOF10: 7
PAINLEVEL_OUTOF10: 6

## 2018-01-12 ASSESSMENT — PAIN DESCRIPTION - LOCATION
LOCATION: CHEST

## 2018-01-12 ASSESSMENT — PAIN DESCRIPTION - PAIN TYPE
TYPE: ACUTE PAIN

## 2018-01-12 ASSESSMENT — PAIN DESCRIPTION - ONSET: ONSET: SUDDEN

## 2018-01-12 ASSESSMENT — PAIN DESCRIPTION - ORIENTATION
ORIENTATION: LEFT
ORIENTATION: LEFT

## 2018-01-13 ENCOUNTER — APPOINTMENT (OUTPATIENT)
Dept: CT IMAGING | Age: 48
DRG: 198 | End: 2018-01-13
Payer: MEDICAID

## 2018-01-13 ENCOUNTER — APPOINTMENT (OUTPATIENT)
Dept: GENERAL RADIOLOGY | Age: 48
DRG: 198 | End: 2018-01-13
Payer: MEDICAID

## 2018-01-13 ENCOUNTER — APPOINTMENT (OUTPATIENT)
Dept: ULTRASOUND IMAGING | Age: 48
DRG: 198 | End: 2018-01-13
Payer: MEDICAID

## 2018-01-13 VITALS
SYSTOLIC BLOOD PRESSURE: 135 MMHG | DIASTOLIC BLOOD PRESSURE: 79 MMHG | WEIGHT: 208 LBS | TEMPERATURE: 97.7 F | OXYGEN SATURATION: 96 % | BODY MASS INDEX: 30.81 KG/M2 | HEIGHT: 69 IN | RESPIRATION RATE: 16 BRPM | HEART RATE: 79 BPM

## 2018-01-13 PROBLEM — M79.89 LEFT ARM SWELLING: Status: ACTIVE | Noted: 2018-01-13

## 2018-01-13 PROBLEM — R07.9 CHEST PAIN: Status: ACTIVE | Noted: 2018-01-13

## 2018-01-13 PROBLEM — R07.89 ANTERIOR CHEST WALL PAIN: Status: ACTIVE | Noted: 2018-01-13

## 2018-01-13 LAB
C-REACTIVE PROTEIN: 0.13 MG/DL (ref 0–1)
EKG ATRIAL RATE: 73 BPM
EKG ATRIAL RATE: 77 BPM
EKG P AXIS: 75 DEGREES
EKG P AXIS: 76 DEGREES
EKG P-R INTERVAL: 172 MS
EKG P-R INTERVAL: 182 MS
EKG Q-T INTERVAL: 370 MS
EKG Q-T INTERVAL: 392 MS
EKG QRS DURATION: 72 MS
EKG QRS DURATION: 74 MS
EKG QTC CALCULATION (BAZETT): 418 MS
EKG QTC CALCULATION (BAZETT): 431 MS
EKG R AXIS: 65 DEGREES
EKG R AXIS: 75 DEGREES
EKG T AXIS: 47 DEGREES
EKG T AXIS: 57 DEGREES
EKG VENTRICULAR RATE: 73 BPM
EKG VENTRICULAR RATE: 77 BPM
GLUCOSE BLD-MCNC: 142 MG/DL (ref 70–108)
GLUCOSE BLD-MCNC: 172 MG/DL (ref 70–108)
GLUCOSE BLD-MCNC: 231 MG/DL (ref 70–108)
PROCALCITONIN: < 0.02 NG/ML (ref 0.01–0.09)
SEDIMENTATION RATE, ERYTHROCYTE: 1 MM/HR (ref 0–10)
TROPONIN T: < 0.01 NG/ML
TROPONIN T: < 0.01 NG/ML

## 2018-01-13 PROCEDURE — G0378 HOSPITAL OBSERVATION PER HR: HCPCS

## 2018-01-13 PROCEDURE — 84484 ASSAY OF TROPONIN QUANT: CPT

## 2018-01-13 PROCEDURE — 86140 C-REACTIVE PROTEIN: CPT

## 2018-01-13 PROCEDURE — 6370000000 HC RX 637 (ALT 250 FOR IP): Performed by: INTERNAL MEDICINE

## 2018-01-13 PROCEDURE — 6360000002 HC RX W HCPCS

## 2018-01-13 PROCEDURE — 6360000002 HC RX W HCPCS: Performed by: INTERNAL MEDICINE

## 2018-01-13 PROCEDURE — 36415 COLL VENOUS BLD VENIPUNCTURE: CPT

## 2018-01-13 PROCEDURE — 6370000000 HC RX 637 (ALT 250 FOR IP): Performed by: HOSPITALIST

## 2018-01-13 PROCEDURE — B3201ZZ COMPUTERIZED TOMOGRAPHY (CT SCAN) OF THORACIC AORTA USING LOW OSMOLAR CONTRAST: ICD-10-PCS | Performed by: RADIOLOGY

## 2018-01-13 PROCEDURE — B32T1ZZ COMPUTERIZED TOMOGRAPHY (CT SCAN) OF LEFT PULMONARY ARTERY USING LOW OSMOLAR CONTRAST: ICD-10-PCS | Performed by: RADIOLOGY

## 2018-01-13 PROCEDURE — 96366 THER/PROPH/DIAG IV INF ADDON: CPT

## 2018-01-13 PROCEDURE — 93005 ELECTROCARDIOGRAM TRACING: CPT

## 2018-01-13 PROCEDURE — 99238 HOSP IP/OBS DSCHRG MGMT 30/<: CPT | Performed by: HOSPITALIST

## 2018-01-13 PROCEDURE — 73030 X-RAY EXAM OF SHOULDER: CPT

## 2018-01-13 PROCEDURE — 99222 1ST HOSP IP/OBS MODERATE 55: CPT | Performed by: INTERNAL MEDICINE

## 2018-01-13 PROCEDURE — 76604 US EXAM CHEST: CPT

## 2018-01-13 PROCEDURE — 6360000002 HC RX W HCPCS: Performed by: NURSE PRACTITIONER

## 2018-01-13 PROCEDURE — 82948 REAGENT STRIP/BLOOD GLUCOSE: CPT

## 2018-01-13 PROCEDURE — 71275 CT ANGIOGRAPHY CHEST: CPT

## 2018-01-13 PROCEDURE — 96375 TX/PRO/DX INJ NEW DRUG ADDON: CPT

## 2018-01-13 PROCEDURE — B32S1ZZ COMPUTERIZED TOMOGRAPHY (CT SCAN) OF RIGHT PULMONARY ARTERY USING LOW OSMOLAR CONTRAST: ICD-10-PCS | Performed by: RADIOLOGY

## 2018-01-13 PROCEDURE — 99222 1ST HOSP IP/OBS MODERATE 55: CPT | Performed by: NURSE PRACTITIONER

## 2018-01-13 PROCEDURE — 85651 RBC SED RATE NONAUTOMATED: CPT

## 2018-01-13 PROCEDURE — 84145 PROCALCITONIN (PCT): CPT

## 2018-01-13 PROCEDURE — 6360000004 HC RX CONTRAST MEDICATION: Performed by: INTERNAL MEDICINE

## 2018-01-13 PROCEDURE — 1200000003 HC TELEMETRY R&B

## 2018-01-13 RX ORDER — MORPHINE SULFATE 4 MG/ML
4 INJECTION, SOLUTION INTRAMUSCULAR; INTRAVENOUS ONCE
Status: DISCONTINUED | OUTPATIENT
Start: 2018-01-13 | End: 2018-01-13

## 2018-01-13 RX ORDER — TRIAMTERENE AND HYDROCHLOROTHIAZIDE 37.5; 25 MG/1; MG/1
0.5 TABLET ORAL DAILY
Status: CANCELLED | OUTPATIENT
Start: 2018-01-13

## 2018-01-13 RX ORDER — MORPHINE SULFATE 2 MG/ML
2 INJECTION, SOLUTION INTRAMUSCULAR; INTRAVENOUS EVERY 4 HOURS PRN
Status: DISCONTINUED | OUTPATIENT
Start: 2018-01-13 | End: 2018-01-13

## 2018-01-13 RX ORDER — OXYCODONE HYDROCHLORIDE AND ACETAMINOPHEN 5; 325 MG/1; MG/1
1 TABLET ORAL EVERY 6 HOURS PRN
Status: DISCONTINUED | OUTPATIENT
Start: 2018-01-13 | End: 2018-01-13 | Stop reason: HOSPADM

## 2018-01-13 RX ORDER — PRASUGREL 10 MG/1
10 TABLET, FILM COATED ORAL DAILY
Status: CANCELLED | OUTPATIENT
Start: 2018-01-13

## 2018-01-13 RX ORDER — ASPIRIN 81 MG/1
81 TABLET ORAL DAILY
Status: CANCELLED | OUTPATIENT
Start: 2018-01-13

## 2018-01-13 RX ORDER — MORPHINE SULFATE 2 MG/ML
4 INJECTION, SOLUTION INTRAMUSCULAR; INTRAVENOUS ONCE
Status: COMPLETED | OUTPATIENT
Start: 2018-01-13 | End: 2018-01-13

## 2018-01-13 RX ORDER — RANOLAZINE 500 MG/1
1000 TABLET, EXTENDED RELEASE ORAL 2 TIMES DAILY
Status: CANCELLED | OUTPATIENT
Start: 2018-01-13

## 2018-01-13 RX ORDER — ALBUTEROL SULFATE 90 UG/1
2 AEROSOL, METERED RESPIRATORY (INHALATION) EVERY 6 HOURS PRN
Status: CANCELLED | OUTPATIENT
Start: 2018-01-13

## 2018-01-13 RX ORDER — MORPHINE SULFATE 2 MG/ML
INJECTION, SOLUTION INTRAMUSCULAR; INTRAVENOUS
Status: DISCONTINUED
Start: 2018-01-13 | End: 2018-01-13

## 2018-01-13 RX ORDER — NICOTINE 21 MG/24HR
1 PATCH, TRANSDERMAL 24 HOURS TRANSDERMAL DAILY
Status: DISCONTINUED | OUTPATIENT
Start: 2018-01-13 | End: 2018-01-13 | Stop reason: HOSPADM

## 2018-01-13 RX ORDER — ISOSORBIDE MONONITRATE 60 MG/1
120 TABLET, EXTENDED RELEASE ORAL DAILY
Status: CANCELLED | OUTPATIENT
Start: 2018-01-13

## 2018-01-13 RX ORDER — OMEPRAZOLE 40 MG/1
40 CAPSULE, DELAYED RELEASE ORAL DAILY
Status: CANCELLED | OUTPATIENT
Start: 2018-01-13

## 2018-01-13 RX ORDER — LISINOPRIL 5 MG/1
5 TABLET ORAL DAILY
Status: CANCELLED | OUTPATIENT
Start: 2018-01-13

## 2018-01-13 RX ORDER — CITALOPRAM 20 MG/1
20 TABLET ORAL DAILY
Status: CANCELLED | OUTPATIENT
Start: 2018-01-13

## 2018-01-13 RX ORDER — THIAMINE HCL 50 MG
100 TABLET ORAL DAILY
Status: DISCONTINUED | OUTPATIENT
Start: 2018-01-13 | End: 2018-01-13 | Stop reason: HOSPADM

## 2018-01-13 RX ORDER — ACETAMINOPHEN 325 MG/1
650 TABLET ORAL EVERY 8 HOURS PRN
Status: DISCONTINUED | OUTPATIENT
Start: 2018-01-13 | End: 2018-01-13 | Stop reason: HOSPADM

## 2018-01-13 RX ORDER — FLUTICASONE PROPIONATE 50 MCG
2 SPRAY, SUSPENSION (ML) NASAL DAILY
Status: CANCELLED | OUTPATIENT
Start: 2018-01-13

## 2018-01-13 RX ORDER — METOPROLOL TARTRATE 50 MG/1
50 TABLET, FILM COATED ORAL 2 TIMES DAILY
Status: CANCELLED | OUTPATIENT
Start: 2018-01-13

## 2018-01-13 RX ORDER — KETOROLAC TROMETHAMINE 30 MG/ML
30 INJECTION, SOLUTION INTRAMUSCULAR; INTRAVENOUS ONCE
Status: COMPLETED | OUTPATIENT
Start: 2018-01-13 | End: 2018-01-13

## 2018-01-13 RX ORDER — PANTOPRAZOLE SODIUM 40 MG/1
40 TABLET, DELAYED RELEASE ORAL DAILY
Status: CANCELLED | OUTPATIENT
Start: 2018-01-13

## 2018-01-13 RX ORDER — OXYCODONE HYDROCHLORIDE AND ACETAMINOPHEN 5; 325 MG/1; MG/1
2 TABLET ORAL EVERY 6 HOURS PRN
Status: DISCONTINUED | OUTPATIENT
Start: 2018-01-13 | End: 2018-01-13 | Stop reason: HOSPADM

## 2018-01-13 RX ADMIN — THIAMINE HCL (VITAMIN B1) 50 MG TABLET 100 MG: at 12:08

## 2018-01-13 RX ADMIN — MORPHINE SULFATE 4 MG: 2 INJECTION, SOLUTION INTRAMUSCULAR; INTRAVENOUS at 00:59

## 2018-01-13 RX ADMIN — IOPAMIDOL 80 ML: 755 INJECTION, SOLUTION INTRAVENOUS at 02:14

## 2018-01-13 RX ADMIN — MORPHINE SULFATE 2 MG: 2 INJECTION, SOLUTION INTRAMUSCULAR; INTRAVENOUS at 08:56

## 2018-01-13 RX ADMIN — OXYCODONE HYDROCHLORIDE AND ACETAMINOPHEN 1 TABLET: 5; 325 TABLET ORAL at 12:19

## 2018-01-13 RX ADMIN — MORPHINE SULFATE 2 MG: 2 INJECTION, SOLUTION INTRAMUSCULAR; INTRAVENOUS at 04:54

## 2018-01-13 RX ADMIN — KETOROLAC TROMETHAMINE 30 MG: 30 INJECTION, SOLUTION INTRAMUSCULAR at 17:22

## 2018-01-13 ASSESSMENT — PAIN SCALES - GENERAL
PAINLEVEL_OUTOF10: 5
PAINLEVEL_OUTOF10: 6
PAINLEVEL_OUTOF10: 5
PAINLEVEL_OUTOF10: 6
PAINLEVEL_OUTOF10: 5
PAINLEVEL_OUTOF10: 5
PAINLEVEL_OUTOF10: 6

## 2018-01-13 ASSESSMENT — PAIN DESCRIPTION - PAIN TYPE
TYPE: ACUTE PAIN

## 2018-01-13 ASSESSMENT — PAIN DESCRIPTION - LOCATION
LOCATION: CHEST

## 2018-01-13 ASSESSMENT — PAIN DESCRIPTION - ORIENTATION
ORIENTATION: LEFT
ORIENTATION: LEFT
ORIENTATION: LEFT;OTHER (COMMENT)
ORIENTATION: LEFT

## 2018-01-13 ASSESSMENT — PAIN DESCRIPTION - DESCRIPTORS
DESCRIPTORS: PRESSURE
DESCRIPTORS: PRESSURE
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
## 2018-01-13 NOTE — ED NOTES
Urine specimen sent. Vitals assessed. Pt rates chest pain a 6-7/10. States his headache has subsided.      Mary Whatley RN  01/12/18 4222

## 2018-01-13 NOTE — FLOWSHEET NOTE
01/13/18 2580   Provider Notification   Reason for Communication New orders   Provider Name Dr. Basia Coleman   Provider Notification Physician   Method of Communication Secure Message   Response Waiting for response   Notification Time 3529     new consult for chest pain, patient on nitro drip, trop negative, has ICD

## 2018-01-13 NOTE — FLOWSHEET NOTE
Advance Directive Consult: Advance Directive materials were provided to patient and explained. Patient is not ready to complete at this time, gave information for Spiritual Care to be contacted if further assistance is needed.  1/13/18 Marc1 Nadiya Chun

## 2018-01-13 NOTE — PROGRESS NOTES
1810: Discharge teaching and instructions for diagnosis/procedure of chest pain completed with patient using teachback method. AVS reviewed. Patient voiced understanding regarding prescriptions, follow up appointments, and care of self at home. 1845: Patient discharged in stable condition.

## 2018-01-13 NOTE — ED NOTES
Pt states he was arguing with the mother of his son when he started getting a sudden chest pain right above his defibrillator. Pt states this started approx. 1 hour ago. Pt states that his defibrillator did not go off. He states he is slightly short of breath. Offers no other complaints.      Starr Flynn RN  01/13/18 2029

## 2018-01-19 ENCOUNTER — TELEPHONE (OUTPATIENT)
Dept: CARDIOLOGY CLINIC | Age: 48
End: 2018-01-19

## 2018-02-13 ENCOUNTER — NURSE ONLY (OUTPATIENT)
Dept: CARDIOLOGY CLINIC | Age: 48
End: 2018-02-13
Payer: MEDICAID

## 2018-02-13 ENCOUNTER — OFFICE VISIT (OUTPATIENT)
Dept: CARDIOLOGY CLINIC | Age: 48
End: 2018-02-13
Payer: MEDICAID

## 2018-02-13 VITALS
BODY MASS INDEX: 30.75 KG/M2 | SYSTOLIC BLOOD PRESSURE: 122 MMHG | HEART RATE: 96 BPM | WEIGHT: 214.8 LBS | HEIGHT: 70 IN | DIASTOLIC BLOOD PRESSURE: 82 MMHG

## 2018-02-13 DIAGNOSIS — I25.10 CORONARY ARTERY DISEASE INVOLVING NATIVE CORONARY ARTERY OF NATIVE HEART WITHOUT ANGINA PECTORIS: ICD-10-CM

## 2018-02-13 DIAGNOSIS — Z95.810 S/P ICD (INTERNAL CARDIAC DEFIBRILLATOR) PROCEDURE: ICD-10-CM

## 2018-02-13 DIAGNOSIS — R07.9 CHEST PAIN ON EXERTION: Primary | ICD-10-CM

## 2018-02-13 DIAGNOSIS — G89.29 CHRONIC CHEST PAIN: ICD-10-CM

## 2018-02-13 DIAGNOSIS — R07.9 CHRONIC CHEST PAIN: ICD-10-CM

## 2018-02-13 DIAGNOSIS — I25.5 ISCHEMIC CARDIOMYOPATHY: ICD-10-CM

## 2018-02-13 DIAGNOSIS — R06.02 SOB (SHORTNESS OF BREATH) ON EXERTION: ICD-10-CM

## 2018-02-13 DIAGNOSIS — E78.5 DYSLIPIDEMIA: ICD-10-CM

## 2018-02-13 DIAGNOSIS — Z98.890 S/P CARDIAC CATH: ICD-10-CM

## 2018-02-13 DIAGNOSIS — Z95.810 S/P ICD (INTERNAL CARDIAC DEFIBRILLATOR) PROCEDURE: Primary | ICD-10-CM

## 2018-02-13 PROCEDURE — G8598 ASA/ANTIPLAT THER USED: HCPCS | Performed by: INTERNAL MEDICINE

## 2018-02-13 PROCEDURE — 99214 OFFICE O/P EST MOD 30 MIN: CPT | Performed by: INTERNAL MEDICINE

## 2018-02-13 PROCEDURE — G8427 DOCREV CUR MEDS BY ELIG CLIN: HCPCS | Performed by: INTERNAL MEDICINE

## 2018-02-13 PROCEDURE — 93282 PRGRMG EVAL IMPLANTABLE DFB: CPT | Performed by: INTERNAL MEDICINE

## 2018-02-13 PROCEDURE — G8417 CALC BMI ABV UP PARAM F/U: HCPCS | Performed by: INTERNAL MEDICINE

## 2018-02-13 PROCEDURE — G8484 FLU IMMUNIZE NO ADMIN: HCPCS | Performed by: INTERNAL MEDICINE

## 2018-02-13 PROCEDURE — 4004F PT TOBACCO SCREEN RCVD TLK: CPT | Performed by: INTERNAL MEDICINE

## 2018-02-13 RX ORDER — NITROGLYCERIN 0.4 MG/1
0.4 TABLET SUBLINGUAL EVERY 5 MIN PRN
Qty: 25 TABLET | Refills: 0 | Status: SHIPPED | OUTPATIENT
Start: 2018-02-13 | End: 2018-08-20

## 2018-02-13 NOTE — PROGRESS NOTES
Josefa pt   9.1 years on device   0% paced imped 380 shock 63  optivol barely elevated   R waves 13.9  RV threshold 0.75 @ 0 .4  SVT vs VT

## 2018-02-13 NOTE — PROGRESS NOTES
@ [address] Right 2017    R eye cyst removal    CYST REMOVAL Right 06/21/2017    eye    ELBOW SURGERY Left     PACEMAKER PLACEMENT         No Known Allergies     Family History   Problem Relation Age of Onset    Heart Disease Mother     Diabetes Mother     Cancer Mother     Coronary Art Dis Mother     Heart Attack Mother     High Blood Pressure Mother     High Cholesterol Mother     Heart Disease Father     Diabetes Father     COPD Father     Coronary Art Dis Father     Heart Attack Father     High Blood Pressure Father     High Cholesterol Father     Stroke Sister     Prostate Cancer Sister 48    Anemia Daughter         Social History     Social History    Marital status: Single     Spouse name: N/A    Number of children: 6    Years of education: 6     Occupational History    disabled      Social History Main Topics    Smoking status: Current Every Day Smoker     Packs/day: 0.50     Years: 29.00     Types: Cigarettes    Smokeless tobacco: Never Used    Alcohol use Yes      Comment: social - 2 times a month    Drug use: No      Comment: Hx of marijuana    Sexual activity: Yes     Partners: Female     Other Topics Concern    Not on file     Social History Narrative    No narrative on file       Current Outpatient Prescriptions   Medication Sig Dispense Refill    nitroGLYCERIN (NITROSTAT) 0.4 MG SL tablet Place 1 tablet under the tongue every 5 minutes as needed for Chest pain 25 tablet 0    fluticasone (FLONASE) 50 MCG/ACT nasal spray 2 sprays by Nasal route daily      metoprolol tartrate (LOPRESSOR) 50 MG tablet Take 1 tablet by mouth 2 times daily 180 tablet 3    lisinopril (PRINIVIL;ZESTRIL) 5 MG tablet Take 1 tablet by mouth daily 90 tablet 2    ranolazine (RANEXA) 1000 MG extended release tablet Take 1 tablet by mouth 2 times daily 90 tablet 1    isosorbide mononitrate (IMDUR) 120 MG extended release tablet Take 1 tablet by mouth daily 90 tablet

## 2018-02-20 ENCOUNTER — PREP FOR PROCEDURE (OUTPATIENT)
Dept: CARDIOLOGY | Age: 48
End: 2018-02-20

## 2018-02-20 RX ORDER — SODIUM CHLORIDE 0.9 % (FLUSH) 0.9 %
10 SYRINGE (ML) INJECTION EVERY 12 HOURS SCHEDULED
Status: CANCELLED | OUTPATIENT
Start: 2018-02-20

## 2018-02-20 RX ORDER — SODIUM CHLORIDE 9 MG/ML
INJECTION, SOLUTION INTRAVENOUS CONTINUOUS
Status: CANCELLED | OUTPATIENT
Start: 2018-02-20

## 2018-02-20 RX ORDER — ASPIRIN 325 MG
325 TABLET ORAL ONCE
Status: CANCELLED | OUTPATIENT
Start: 2018-02-20 | End: 2018-02-20

## 2018-02-20 RX ORDER — NITROGLYCERIN 0.4 MG/1
0.4 TABLET SUBLINGUAL EVERY 5 MIN PRN
Status: CANCELLED | OUTPATIENT
Start: 2018-02-20

## 2018-02-20 RX ORDER — SODIUM CHLORIDE 0.9 % (FLUSH) 0.9 %
10 SYRINGE (ML) INJECTION PRN
Status: CANCELLED | OUTPATIENT
Start: 2018-02-20

## 2018-02-21 ENCOUNTER — TELEPHONE (OUTPATIENT)
Dept: CARDIOLOGY CLINIC | Age: 48
End: 2018-02-21

## 2018-03-13 ENCOUNTER — TELEPHONE (OUTPATIENT)
Dept: CARDIOLOGY CLINIC | Age: 48
End: 2018-03-13

## 2018-04-03 ENCOUNTER — TELEPHONE (OUTPATIENT)
Dept: CARDIOLOGY CLINIC | Age: 48
End: 2018-04-03

## 2018-05-31 ENCOUNTER — TELEPHONE (OUTPATIENT)
Dept: CARDIOLOGY CLINIC | Age: 48
End: 2018-05-31

## 2018-06-14 ENCOUNTER — TELEPHONE (OUTPATIENT)
Dept: CARDIOLOGY CLINIC | Age: 48
End: 2018-06-14

## 2018-08-19 ENCOUNTER — HOSPITAL ENCOUNTER (OUTPATIENT)
Age: 48
Setting detail: OBSERVATION
Discharge: HOME | End: 2018-08-21
Attending: FAMILY MEDICINE | Admitting: FAMILY MEDICINE
Payer: MEDICAID

## 2018-08-19 DIAGNOSIS — R07.89 OTHER CHEST PAIN: Primary | ICD-10-CM

## 2018-08-19 PROCEDURE — 83880 ASSAY OF NATRIURETIC PEPTIDE: CPT

## 2018-08-19 PROCEDURE — 83735 ASSAY OF MAGNESIUM: CPT

## 2018-08-19 PROCEDURE — 93005 ELECTROCARDIOGRAM TRACING: CPT

## 2018-08-19 PROCEDURE — 93005 ELECTROCARDIOGRAM TRACING: CPT | Performed by: NURSE PRACTITIONER

## 2018-08-19 PROCEDURE — 83036 HEMOGLOBIN GLYCOSYLATED A1C: CPT

## 2018-08-19 PROCEDURE — 82248 BILIRUBIN DIRECT: CPT

## 2018-08-19 PROCEDURE — 85025 COMPLETE CBC W/AUTO DIFF WBC: CPT

## 2018-08-19 PROCEDURE — 83690 ASSAY OF LIPASE: CPT

## 2018-08-19 PROCEDURE — 99285 EMERGENCY DEPT VISIT HI MDM: CPT

## 2018-08-19 PROCEDURE — 84484 ASSAY OF TROPONIN QUANT: CPT

## 2018-08-19 PROCEDURE — 80053 COMPREHEN METABOLIC PANEL: CPT

## 2018-08-19 PROCEDURE — 36415 COLL VENOUS BLD VENIPUNCTURE: CPT

## 2018-08-20 ENCOUNTER — APPOINTMENT (OUTPATIENT)
Dept: CT IMAGING | Age: 48
End: 2018-08-20
Payer: MEDICAID

## 2018-08-20 ENCOUNTER — APPOINTMENT (OUTPATIENT)
Dept: GENERAL RADIOLOGY | Age: 48
End: 2018-08-20
Payer: MEDICAID

## 2018-08-20 PROBLEM — R07.9 CHEST PAIN ON EXERTION: Status: RESOLVED | Noted: 2018-02-13 | Resolved: 2018-08-20

## 2018-08-20 PROBLEM — R07.9 CHEST PAIN IN ADULT: Status: RESOLVED | Noted: 2018-02-13 | Resolved: 2018-08-20

## 2018-08-20 PROBLEM — F12.90 MARIJUANA USE: Status: ACTIVE | Noted: 2018-08-20

## 2018-08-20 PROBLEM — Z87.19 HX OF GASTROESOPHAGEAL REFLUX (GERD): Status: ACTIVE | Noted: 2018-08-20

## 2018-08-20 PROBLEM — R06.02 SOB (SHORTNESS OF BREATH) ON EXERTION: Status: RESOLVED | Noted: 2017-01-10 | Resolved: 2018-08-20

## 2018-08-20 PROBLEM — R07.9 CHEST PAIN AT REST: Status: RESOLVED | Noted: 2017-01-10 | Resolved: 2018-08-20

## 2018-08-20 PROBLEM — Z87.09 HISTORY OF ASTHMA: Status: ACTIVE | Noted: 2018-08-20

## 2018-08-20 PROBLEM — M79.89 LEFT ARM SWELLING: Status: RESOLVED | Noted: 2018-01-13 | Resolved: 2018-08-20

## 2018-08-20 PROBLEM — Z01.818 PRE-OP EVALUATION: Status: RESOLVED | Noted: 2017-01-10 | Resolved: 2018-08-20

## 2018-08-20 PROBLEM — Z72.0 NICOTINE ABUSE: Status: ACTIVE | Noted: 2018-08-20

## 2018-08-20 PROBLEM — R07.9 CHEST PAIN AT REST: Status: RESOLVED | Noted: 2017-01-30 | Resolved: 2018-08-20

## 2018-08-20 PROBLEM — R07.9 CHEST PAIN AT REST: Status: RESOLVED | Noted: 2017-06-26 | Resolved: 2018-08-20

## 2018-08-20 LAB
ALBUMIN SERPL-MCNC: 4.7 G/DL (ref 3.5–5.1)
ALP BLD-CCNC: 78 U/L (ref 38–126)
ALT SERPL-CCNC: 16 U/L (ref 11–66)
AMPHETAMINE+METHAMPHETAMINE URINE SCREEN: NEGATIVE
ANION GAP SERPL CALCULATED.3IONS-SCNC: 16 MEQ/L (ref 8–16)
AST SERPL-CCNC: 16 U/L (ref 5–40)
AVERAGE GLUCOSE: 153 MG/DL (ref 70–126)
BARBITURATE QUANTITATIVE URINE: NEGATIVE
BASOPHILS # BLD: 0.5 %
BASOPHILS ABSOLUTE: 0.1 THOU/MM3 (ref 0–0.1)
BENZODIAZEPINE QUANTITATIVE URINE: NEGATIVE
BILIRUB SERPL-MCNC: 0.2 MG/DL (ref 0.3–1.2)
BILIRUBIN DIRECT: < 0.2 MG/DL (ref 0–0.3)
BUN BLDV-MCNC: 14 MG/DL (ref 7–22)
CALCIUM SERPL-MCNC: 10.1 MG/DL (ref 8.5–10.5)
CANNABINOID QUANTITATIVE URINE: POSITIVE
CHLORIDE BLD-SCNC: 100 MEQ/L (ref 98–111)
CO2: 26 MEQ/L (ref 23–33)
COCAINE METABOLITE QUANTITATIVE URINE: NEGATIVE
CREAT SERPL-MCNC: 1.1 MG/DL (ref 0.4–1.2)
D-DIMER QUANTITATIVE: 509 NG/ML FEU (ref 0–500)
EOSINOPHIL # BLD: 1.7 %
EOSINOPHILS ABSOLUTE: 0.2 THOU/MM3 (ref 0–0.4)
ERYTHROCYTE [DISTWIDTH] IN BLOOD BY AUTOMATED COUNT: 13.5 % (ref 11.5–14.5)
ERYTHROCYTE [DISTWIDTH] IN BLOOD BY AUTOMATED COUNT: 43.3 FL (ref 35–45)
GFR SERPL CREATININE-BSD FRML MDRD: 86 ML/MIN/1.73M2
GLUCOSE BLD-MCNC: 140 MG/DL (ref 70–108)
GLUCOSE BLD-MCNC: 149 MG/DL (ref 70–108)
GLUCOSE BLD-MCNC: 155 MG/DL (ref 70–108)
GLUCOSE BLD-MCNC: 161 MG/DL (ref 70–108)
GLUCOSE BLD-MCNC: 312 MG/DL (ref 70–108)
HBA1C MFR BLD: 7.1 % (ref 4.4–6.4)
HCT VFR BLD CALC: 46.8 % (ref 42–52)
HEMOGLOBIN: 16.6 GM/DL (ref 14–18)
IMMATURE GRANS (ABS): 0.04 THOU/MM3 (ref 0–0.07)
IMMATURE GRANULOCYTES: 0.3 %
INR BLD: 0.98 (ref 0.85–1.13)
LIPASE: 16 U/L (ref 5.6–51.3)
LYMPHOCYTES # BLD: 33.4 %
LYMPHOCYTES ABSOLUTE: 4 THOU/MM3 (ref 1–4.8)
MAGNESIUM: 1.6 MG/DL (ref 1.6–2.4)
MCH RBC QN AUTO: 31.1 PG (ref 26–33)
MCHC RBC AUTO-ENTMCNC: 35.5 GM/DL (ref 32.2–35.5)
MCV RBC AUTO: 87.8 FL (ref 80–94)
MONOCYTES # BLD: 5.9 %
MONOCYTES ABSOLUTE: 0.7 THOU/MM3 (ref 0.4–1.3)
NUCLEATED RED BLOOD CELLS: 0 /100 WBC
OPIATES, URINE: NEGATIVE
OSMOLALITY CALCULATION: 287.1 MOSMOL/KG (ref 275–300)
OXYCODONE: NEGATIVE
PHENCYCLIDINE QUANTITATIVE URINE: NEGATIVE
PLATELET # BLD: 267 THOU/MM3 (ref 130–400)
PMV BLD AUTO: 10.5 FL (ref 9.4–12.4)
POTASSIUM SERPL-SCNC: 3.8 MEQ/L (ref 3.5–5.2)
PRO-BNP: 44 PG/ML (ref 0–450)
RBC # BLD: 5.33 MILL/MM3 (ref 4.7–6.1)
SEG NEUTROPHILS: 58.2 %
SEGMENTED NEUTROPHILS ABSOLUTE COUNT: 7 THOU/MM3 (ref 1.8–7.7)
SODIUM BLD-SCNC: 142 MEQ/L (ref 135–145)
TOTAL PROTEIN: 7.9 G/DL (ref 6.1–8)
TROPONIN T: < 0.01 NG/ML
TROPONIN T: < 0.01 NG/ML
TSH SERPL DL<=0.05 MIU/L-ACNC: 1.45 UIU/ML (ref 0.4–4.2)
WBC # BLD: 12.1 THOU/MM3 (ref 4.8–10.8)

## 2018-08-20 PROCEDURE — 96366 THER/PROPH/DIAG IV INF ADDON: CPT

## 2018-08-20 PROCEDURE — 99244 OFF/OP CNSLTJ NEW/EST MOD 40: CPT | Performed by: NUCLEAR MEDICINE

## 2018-08-20 PROCEDURE — 2580000003 HC RX 258: Performed by: NURSE PRACTITIONER

## 2018-08-20 PROCEDURE — 2580000003 HC RX 258: Performed by: INTERNAL MEDICINE

## 2018-08-20 PROCEDURE — 2709999900 HC NON-CHARGEABLE SUPPLY

## 2018-08-20 PROCEDURE — 96375 TX/PRO/DX INJ NEW DRUG ADDON: CPT

## 2018-08-20 PROCEDURE — 2500000003 HC RX 250 WO HCPCS: Performed by: NURSE PRACTITIONER

## 2018-08-20 PROCEDURE — 6370000000 HC RX 637 (ALT 250 FOR IP): Performed by: NUCLEAR MEDICINE

## 2018-08-20 PROCEDURE — 6370000000 HC RX 637 (ALT 250 FOR IP): Performed by: NURSE PRACTITIONER

## 2018-08-20 PROCEDURE — 6360000004 HC RX CONTRAST MEDICATION: Performed by: NURSE PRACTITIONER

## 2018-08-20 PROCEDURE — 84484 ASSAY OF TROPONIN QUANT: CPT

## 2018-08-20 PROCEDURE — 71045 X-RAY EXAM CHEST 1 VIEW: CPT

## 2018-08-20 PROCEDURE — G0378 HOSPITAL OBSERVATION PER HR: HCPCS

## 2018-08-20 PROCEDURE — 85379 FIBRIN DEGRADATION QUANT: CPT

## 2018-08-20 PROCEDURE — 36415 COLL VENOUS BLD VENIPUNCTURE: CPT

## 2018-08-20 PROCEDURE — 71275 CT ANGIOGRAPHY CHEST: CPT

## 2018-08-20 PROCEDURE — 6360000002 HC RX W HCPCS: Performed by: NURSE PRACTITIONER

## 2018-08-20 PROCEDURE — 96365 THER/PROPH/DIAG IV INF INIT: CPT

## 2018-08-20 PROCEDURE — 85610 PROTHROMBIN TIME: CPT

## 2018-08-20 PROCEDURE — 6370000000 HC RX 637 (ALT 250 FOR IP): Performed by: INTERNAL MEDICINE

## 2018-08-20 PROCEDURE — 82948 REAGENT STRIP/BLOOD GLUCOSE: CPT

## 2018-08-20 PROCEDURE — 96372 THER/PROPH/DIAG INJ SC/IM: CPT

## 2018-08-20 PROCEDURE — 99219 PR INITIAL OBSERVATION CARE/DAY 50 MINUTES: CPT | Performed by: NURSE PRACTITIONER

## 2018-08-20 PROCEDURE — 84443 ASSAY THYROID STIM HORMONE: CPT

## 2018-08-20 PROCEDURE — 80307 DRUG TEST PRSMV CHEM ANLYZR: CPT

## 2018-08-20 PROCEDURE — 94640 AIRWAY INHALATION TREATMENT: CPT

## 2018-08-20 RX ORDER — 0.9 % SODIUM CHLORIDE 0.9 %
1000 INTRAVENOUS SOLUTION INTRAVENOUS ONCE
Status: COMPLETED | OUTPATIENT
Start: 2018-08-20 | End: 2018-08-20

## 2018-08-20 RX ORDER — PANTOPRAZOLE SODIUM 40 MG/1
40 TABLET, DELAYED RELEASE ORAL
Status: DISCONTINUED | OUTPATIENT
Start: 2018-08-20 | End: 2018-08-21 | Stop reason: HOSPADM

## 2018-08-20 RX ORDER — DEXTROSE MONOHYDRATE 50 MG/ML
100 INJECTION, SOLUTION INTRAVENOUS PRN
Status: DISCONTINUED | OUTPATIENT
Start: 2018-08-20 | End: 2018-08-21 | Stop reason: HOSPADM

## 2018-08-20 RX ORDER — CITALOPRAM 20 MG/1
20 TABLET ORAL DAILY
Status: DISCONTINUED | OUTPATIENT
Start: 2018-08-20 | End: 2018-08-21 | Stop reason: HOSPADM

## 2018-08-20 RX ORDER — METOPROLOL TARTRATE 50 MG/1
50 TABLET, FILM COATED ORAL 2 TIMES DAILY
Status: DISCONTINUED | OUTPATIENT
Start: 2018-08-20 | End: 2018-08-21

## 2018-08-20 RX ORDER — OMEPRAZOLE 40 MG/1
40 CAPSULE, DELAYED RELEASE ORAL DAILY
Status: DISCONTINUED | OUTPATIENT
Start: 2018-08-20 | End: 2018-08-20 | Stop reason: CLARIF

## 2018-08-20 RX ORDER — ACETAMINOPHEN 325 MG/1
650 TABLET ORAL EVERY 4 HOURS PRN
Status: DISCONTINUED | OUTPATIENT
Start: 2018-08-20 | End: 2018-08-21 | Stop reason: HOSPADM

## 2018-08-20 RX ORDER — FLUTICASONE PROPIONATE 50 MCG
2 SPRAY, SUSPENSION (ML) NASAL DAILY
Status: DISCONTINUED | OUTPATIENT
Start: 2018-08-20 | End: 2018-08-21 | Stop reason: HOSPADM

## 2018-08-20 RX ORDER — INSULIN GLARGINE 100 [IU]/ML
10 INJECTION, SOLUTION SUBCUTANEOUS 2 TIMES DAILY
Status: DISCONTINUED | OUTPATIENT
Start: 2018-08-20 | End: 2018-08-21 | Stop reason: HOSPADM

## 2018-08-20 RX ORDER — LISINOPRIL 5 MG/1
5 TABLET ORAL DAILY
Status: DISCONTINUED | OUTPATIENT
Start: 2018-08-20 | End: 2018-08-21 | Stop reason: HOSPADM

## 2018-08-20 RX ORDER — DEXTROSE MONOHYDRATE 25 G/50ML
12.5 INJECTION, SOLUTION INTRAVENOUS PRN
Status: DISCONTINUED | OUTPATIENT
Start: 2018-08-20 | End: 2018-08-21 | Stop reason: HOSPADM

## 2018-08-20 RX ORDER — NITROGLYCERIN 0.4 MG/1
0.4 TABLET SUBLINGUAL EVERY 5 MIN PRN
Status: DISCONTINUED | OUTPATIENT
Start: 2018-08-20 | End: 2018-08-20

## 2018-08-20 RX ORDER — ACETAMINOPHEN 325 MG/1
650 TABLET ORAL EVERY 4 HOURS PRN
Status: DISCONTINUED | OUTPATIENT
Start: 2018-08-20 | End: 2018-08-20

## 2018-08-20 RX ORDER — SODIUM CHLORIDE 0.9 % (FLUSH) 0.9 %
10 SYRINGE (ML) INJECTION PRN
Status: DISCONTINUED | OUTPATIENT
Start: 2018-08-20 | End: 2018-08-21 | Stop reason: HOSPADM

## 2018-08-20 RX ORDER — RANOLAZINE 500 MG/1
1000 TABLET, EXTENDED RELEASE ORAL 2 TIMES DAILY
Status: DISCONTINUED | OUTPATIENT
Start: 2018-08-20 | End: 2018-08-21 | Stop reason: HOSPADM

## 2018-08-20 RX ORDER — ALBUTEROL SULFATE 90 UG/1
2 AEROSOL, METERED RESPIRATORY (INHALATION) EVERY 6 HOURS PRN
Status: DISCONTINUED | OUTPATIENT
Start: 2018-08-20 | End: 2018-08-21 | Stop reason: HOSPADM

## 2018-08-20 RX ORDER — TRIAMTERENE AND HYDROCHLOROTHIAZIDE 37.5; 25 MG/1; MG/1
0.5 TABLET ORAL DAILY
Status: DISCONTINUED | OUTPATIENT
Start: 2018-08-20 | End: 2018-08-21 | Stop reason: HOSPADM

## 2018-08-20 RX ORDER — ONDANSETRON 4 MG/1
4 TABLET, ORALLY DISINTEGRATING ORAL EVERY 8 HOURS PRN
Status: DISCONTINUED | OUTPATIENT
Start: 2018-08-20 | End: 2018-08-21 | Stop reason: HOSPADM

## 2018-08-20 RX ORDER — SODIUM CHLORIDE 9 MG/ML
INJECTION, SOLUTION INTRAVENOUS CONTINUOUS
Status: DISCONTINUED | OUTPATIENT
Start: 2018-08-20 | End: 2018-08-20

## 2018-08-20 RX ORDER — SODIUM CHLORIDE 0.9 % (FLUSH) 0.9 %
10 SYRINGE (ML) INJECTION EVERY 12 HOURS SCHEDULED
Status: DISCONTINUED | OUTPATIENT
Start: 2018-08-20 | End: 2018-08-21 | Stop reason: HOSPADM

## 2018-08-20 RX ORDER — ASPIRIN 81 MG/1
81 TABLET ORAL DAILY
Status: DISCONTINUED | OUTPATIENT
Start: 2018-08-21 | End: 2018-08-21 | Stop reason: HOSPADM

## 2018-08-20 RX ORDER — NICOTINE 21 MG/24HR
1 PATCH, TRANSDERMAL 24 HOURS TRANSDERMAL DAILY
Status: DISCONTINUED | OUTPATIENT
Start: 2018-08-20 | End: 2018-08-21 | Stop reason: HOSPADM

## 2018-08-20 RX ORDER — PRASUGREL 10 MG/1
10 TABLET, FILM COATED ORAL DAILY
Status: DISCONTINUED | OUTPATIENT
Start: 2018-08-20 | End: 2018-08-21 | Stop reason: HOSPADM

## 2018-08-20 RX ORDER — NITROGLYCERIN 20 MG/100ML
5 INJECTION INTRAVENOUS CONTINUOUS
Status: DISCONTINUED | OUTPATIENT
Start: 2018-08-20 | End: 2018-08-20

## 2018-08-20 RX ORDER — NICOTINE POLACRILEX 4 MG
15 LOZENGE BUCCAL PRN
Status: DISCONTINUED | OUTPATIENT
Start: 2018-08-20 | End: 2018-08-21 | Stop reason: HOSPADM

## 2018-08-20 RX ORDER — OXYCODONE HYDROCHLORIDE AND ACETAMINOPHEN 5; 325 MG/1; MG/1
1 TABLET ORAL EVERY 6 HOURS PRN
Status: DISCONTINUED | OUTPATIENT
Start: 2018-08-20 | End: 2018-08-21 | Stop reason: HOSPADM

## 2018-08-20 RX ORDER — ONDANSETRON 4 MG/1
4 TABLET, ORALLY DISINTEGRATING ORAL ONCE
Status: COMPLETED | OUTPATIENT
Start: 2018-08-20 | End: 2018-08-20

## 2018-08-20 RX ORDER — DOCUSATE SODIUM 100 MG/1
100 CAPSULE, LIQUID FILLED ORAL 2 TIMES DAILY PRN
Status: DISCONTINUED | OUTPATIENT
Start: 2018-08-20 | End: 2018-08-21 | Stop reason: HOSPADM

## 2018-08-20 RX ORDER — ASPIRIN 81 MG/1
324 TABLET, CHEWABLE ORAL ONCE
Status: COMPLETED | OUTPATIENT
Start: 2018-08-20 | End: 2018-08-20

## 2018-08-20 RX ORDER — MORPHINE SULFATE 2 MG/ML
2 INJECTION, SOLUTION INTRAMUSCULAR; INTRAVENOUS ONCE
Status: COMPLETED | OUTPATIENT
Start: 2018-08-20 | End: 2018-08-20

## 2018-08-20 RX ORDER — NITROGLYCERIN 0.4 MG/1
0.4 TABLET SUBLINGUAL EVERY 5 MIN PRN
Status: DISCONTINUED | OUTPATIENT
Start: 2018-08-20 | End: 2018-08-21 | Stop reason: HOSPADM

## 2018-08-20 RX ADMIN — SODIUM CHLORIDE 1000 ML: 9 INJECTION, SOLUTION INTRAVENOUS at 01:24

## 2018-08-20 RX ADMIN — RANOLAZINE 1000 MG: 500 TABLET, FILM COATED, EXTENDED RELEASE ORAL at 08:31

## 2018-08-20 RX ADMIN — RANOLAZINE 1000 MG: 500 TABLET, FILM COATED, EXTENDED RELEASE ORAL at 20:14

## 2018-08-20 RX ADMIN — TIOTROPIUM BROMIDE 18 MCG: 18 CAPSULE ORAL; RESPIRATORY (INHALATION) at 08:47

## 2018-08-20 RX ADMIN — METOPROLOL TARTRATE 50 MG: 50 TABLET, FILM COATED ORAL at 08:31

## 2018-08-20 RX ADMIN — PRASUGREL HYDROCHLORIDE 10 MG: 10 TABLET, FILM COATED ORAL at 08:31

## 2018-08-20 RX ADMIN — Medication 10 ML: at 23:54

## 2018-08-20 RX ADMIN — ACETAMINOPHEN 650 MG: 325 TABLET ORAL at 12:34

## 2018-08-20 RX ADMIN — CITALOPRAM 20 MG: 20 TABLET, FILM COATED ORAL at 08:32

## 2018-08-20 RX ADMIN — ASPIRIN 81 MG 324 MG: 81 TABLET ORAL at 00:17

## 2018-08-20 RX ADMIN — FLUTICASONE PROPIONATE 2 SPRAY: 50 SPRAY, METERED NASAL at 08:32

## 2018-08-20 RX ADMIN — MORPHINE SULFATE 2 MG: 2 INJECTION, SOLUTION INTRAMUSCULAR; INTRAVENOUS at 01:25

## 2018-08-20 RX ADMIN — LISINOPRIL 5 MG: 5 TABLET ORAL at 08:32

## 2018-08-20 RX ADMIN — NITROGLYCERIN 5 MCG/MIN: 20 INJECTION INTRAVENOUS at 01:26

## 2018-08-20 RX ADMIN — IOPAMIDOL 80 ML: 755 INJECTION, SOLUTION INTRAVENOUS at 01:47

## 2018-08-20 RX ADMIN — INSULIN LISPRO 3 UNITS: 100 INJECTION, SOLUTION INTRAVENOUS; SUBCUTANEOUS at 17:58

## 2018-08-20 RX ADMIN — INSULIN LISPRO 12 UNITS: 100 INJECTION, SOLUTION INTRAVENOUS; SUBCUTANEOUS at 12:20

## 2018-08-20 RX ADMIN — TRIAMTERENE AND HYDROCHLOROTHIAZIDE 0.5 TABLET: 37.5; 25 TABLET ORAL at 08:31

## 2018-08-20 RX ADMIN — OXYCODONE HYDROCHLORIDE AND ACETAMINOPHEN 1 TABLET: 5; 325 TABLET ORAL at 20:14

## 2018-08-20 RX ADMIN — SODIUM CHLORIDE 1000 ML: 9 INJECTION, SOLUTION INTRAVENOUS at 12:19

## 2018-08-20 RX ADMIN — METOPROLOL TARTRATE 50 MG: 50 TABLET, FILM COATED ORAL at 20:14

## 2018-08-20 RX ADMIN — INSULIN LISPRO 2 UNITS: 100 INJECTION, SOLUTION INTRAVENOUS; SUBCUTANEOUS at 21:34

## 2018-08-20 RX ADMIN — ONDANSETRON 4 MG: 4 TABLET, ORALLY DISINTEGRATING ORAL at 01:24

## 2018-08-20 RX ADMIN — INSULIN GLARGINE 10 UNITS: 100 INJECTION, SOLUTION SUBCUTANEOUS at 21:34

## 2018-08-20 RX ADMIN — SODIUM CHLORIDE: 9 INJECTION, SOLUTION INTRAVENOUS at 14:19

## 2018-08-20 RX ADMIN — NITROGLYCERIN 0.4 MG: 0.4 TABLET SUBLINGUAL at 00:17

## 2018-08-20 RX ADMIN — Medication 1 UNITS: at 08:27

## 2018-08-20 RX ADMIN — INSULIN GLARGINE 10 UNITS: 100 INJECTION, SOLUTION SUBCUTANEOUS at 12:57

## 2018-08-20 RX ADMIN — PANTOPRAZOLE SODIUM 40 MG: 40 TABLET, DELAYED RELEASE ORAL at 08:32

## 2018-08-20 RX ADMIN — ENOXAPARIN SODIUM 40 MG: 40 INJECTION SUBCUTANEOUS at 08:31

## 2018-08-20 RX ADMIN — TRAZODONE HYDROCHLORIDE 150 MG: 100 TABLET ORAL at 23:54

## 2018-08-20 ASSESSMENT — ENCOUNTER SYMPTOMS
EYE REDNESS: 0
BLOOD IN STOOL: 0
RECTAL PAIN: 0
EYE DISCHARGE: 0
DIARRHEA: 0
BACK PAIN: 0
TROUBLE SWALLOWING: 0
CHOKING: 0
VOICE CHANGE: 0
PHOTOPHOBIA: 0
STRIDOR: 0
SINUS PAIN: 0
NAUSEA: 0
APNEA: 0
EYE ITCHING: 0
WHEEZING: 0
ABDOMINAL DISTENTION: 0
SORE THROAT: 0
ABDOMINAL PAIN: 0
SHORTNESS OF BREATH: 1
SHORTNESS OF BREATH: 0
RHINORRHEA: 0
COLOR CHANGE: 0
VOMITING: 0
COUGH: 0

## 2018-08-20 ASSESSMENT — PAIN DESCRIPTION - DESCRIPTORS
DESCRIPTORS: SHARP
DESCRIPTORS: SHARP;STABBING
DESCRIPTORS: SHARP;STABBING
DESCRIPTORS: SHARP
DESCRIPTORS: SHARP

## 2018-08-20 ASSESSMENT — PAIN DESCRIPTION - PAIN TYPE
TYPE: ACUTE PAIN

## 2018-08-20 ASSESSMENT — PAIN SCALES - GENERAL
PAINLEVEL_OUTOF10: 5
PAINLEVEL_OUTOF10: 8
PAINLEVEL_OUTOF10: 4
PAINLEVEL_OUTOF10: 6
PAINLEVEL_OUTOF10: 8
PAINLEVEL_OUTOF10: 6
PAINLEVEL_OUTOF10: 6
PAINLEVEL_OUTOF10: 8
PAINLEVEL_OUTOF10: 5
PAINLEVEL_OUTOF10: 5

## 2018-08-20 ASSESSMENT — PAIN DESCRIPTION - ORIENTATION
ORIENTATION: LEFT;UPPER

## 2018-08-20 ASSESSMENT — PAIN DESCRIPTION - ONSET
ONSET: ON-GOING
ONSET: ON-GOING

## 2018-08-20 ASSESSMENT — PAIN DESCRIPTION - LOCATION
LOCATION: CHEST
LOCATION: CHEST;HEAD
LOCATION: CHEST

## 2018-08-20 ASSESSMENT — PAIN DESCRIPTION - FREQUENCY
FREQUENCY: CONTINUOUS

## 2018-08-20 ASSESSMENT — PAIN DESCRIPTION - PROGRESSION: CLINICAL_PROGRESSION: NOT CHANGED

## 2018-08-20 ASSESSMENT — HEART SCORE: ECG: 0

## 2018-08-20 NOTE — PROGRESS NOTES
place, and time. He displays normal reflexes. No cranial nerve deficit. He exhibits normal muscle tone. Coordination normal.   Skin: Skin is warm and dry. No rash noted. He is not diaphoretic. No erythema. No pallor. Psychiatric: He has a normal mood and affect. His behavior is normal. Judgment and thought content normal.   Nursing note and vitals reviewed. Medications:    [START ON 8/21/2018] aspirin EC  81 mg Oral Daily    citalopram  20 mg Oral Daily    fluticasone  2 spray Nasal Daily    lisinopril  5 mg Oral Daily    metoprolol tartrate  50 mg Oral BID    prasugrel  10 mg Oral Daily    ranolazine  1,000 mg Oral BID    tiotropium  18 mcg Inhalation Daily    traZODone  150 mg Oral Nightly    triamterene-hydrochlorothiazide  0.5 tablet Oral Daily    sodium chloride flush  10 mL Intravenous 2 times per day    enoxaparin  40 mg Subcutaneous Daily    nicotine  1 patch Transdermal Daily    pantoprazole  40 mg Oral QAM AC    insulin lispro  0-18 Units Subcutaneous TID WC    insulin lispro  0-9 Units Subcutaneous Nightly    insulin glargine  10 Units Subcutaneous BID     Continuous Infusions:   dextrose       PRN Meds:nitroGLYCERIN, albuterol sulfate HFA, sodium chloride flush, docusate sodium, ondansetron, glucose, dextrose, glucagon (rDNA), dextrose, acetaminophen    Data:  CBC:   Recent Labs      08/19/18   2350   WBC  12.1*   RBC  5.33   HGB  16.6   HCT  46.8   MCV  87.8   PLT  267     BMP:   Recent Labs      08/19/18   2350   NA  142   K  3.8   CL  100   CO2  26   BUN  14   CREATININE  1.1     BNP: No results for input(s): BNP in the last 72 hours. PT/INR:   Recent Labs      08/20/18   0020   INR  0.98     APTT: No results for input(s): APTT in the last 72 hours.   CARDIAC ENZYMES:   Recent Labs      08/19/18   2350  08/20/18   0411   TROPONINT  < 0.010  < 0.010     FASTING LIPID PANEL:  Lab Results   Component Value Date    CHOL 191 10/22/2014    HDL 42 10/22/2014    TRIG 191 10/22/2014 LIVER PROFILE:   Recent Labs      08/19/18   2350   AST  16   ALT  16   BILIDIR  <0.2   BILITOT  0.2*   ALKPHOS  78      ABGs: No results found for: PH, PCO2, PO2, HCO3, O2SAT      MICROBIOLOGY & HISTOPATHOLOGY. None. TOXICOLOGY. Results for Betty Phillips (MRN [id number]) as of 8/20/2018 15:40   Ref. Range 1/12/2018 22:16   AMPHETAMINE+METHAMPHETAMINE URINE SCREEN Latest Ref Range: NEGATIVE  Negative   Barbiturate Quant, Ur Latest Ref Range: NEGATIVE  Negative   Benzodiazepine Quant, Ur Latest Ref Range: NEGATIVE  Negative   Cannabinoid Quant, Ur Latest Ref Range: NEGATIVE  POSITIVE   Cocaine Metab Quant, Ur Latest Ref Range: NEGATIVE  Negative   PCP Quant, Ur Latest Ref Range: NEGATIVE  Negative   Opiates, Urine Latest Ref Range: NEGATIVE  Negative       ENDOSCOPE STUDIES. None. PROCEDURES. None. RADIOLOGY. CTA-CHEST -8/20/2018. No pulmonary emboli or pulmonary infiltrates. CXR-8/20/2018. Stable radiographic appearance of the chest.   No evidence of an acute process. ECHO-9/6/2017. Left ventricle size is normal.  Normal left ventricular wall thickness. There was moderate global hypokinesis of the left ventricle. Systolic function was moderately reduced. Ejection fraction is visually estimated at 40%. The left atrium is Mildly dilated. STRESS TEST-10/9/2015. IMPRESSIONS:     -  The study ECG was negative for ischemia after maximal exercise without reproduction of symptoms. -  Myocardial perfusion imaging is not suggestive for myocardial ischemia. -  Left ventricular systolic function was reduced, with regional wall motion abnormalities. PREVIOUS STUDY COMPARISON:   There is evidence of global improvement of perfusion and wall motion when compared with  similar study of 22-Oct-2014. ASSESSMENT AND  PLAN. 1.CARDIOVASCULAR. CHEST PAIN D/T UNSTABLE ANGINA-NEGATIVE TROPONIN. CAD W/ PRIOR STENTS X 2.     CHF W/ CHRONIC SYSTOLIC DYSFUNCTION-COMPENSATED. ISCHEMIC CMP W/ EF 40 % W/ AICD. HLD-CHECK LIPID PANEL. HTN-ON LISINOPRIL,METOPROLOL , RENEXA, MAXZIDE,     CARDIOLOGY CONSULTED. 2.ENDO. HYPERGLYCEMIA D/T T2 DM-A1 7.1 -SSI AND LANTUS. TSH -1.45. 3.PSYCH.     ANXIETY DISORDER. 4.LIFE STYLE.     CHRONIC TOBACCO USE DISORDER-NICOTINE PATCH. CHRONIC CANNABIS USE.    5.DISPO. PLANNED D/C TO HOME SOON.       Electronically signed by Mirta Dalton MD on 8/20/2018 at 4:11 PM    Rounding Hospitalist

## 2018-08-20 NOTE — H&P
nightly   Yes Historical Provider, MD   Tiotropium Bromide Monohydrate (SPIRIVA HANDIHALER IN) Inhale into the lungs every morning    Yes Historical Provider, MD   citalopram (CELEXA) 20 MG tablet Take 20 mg by mouth daily. Yes Historical Provider, MD   metFORMIN (GLUCOPHAGE) 500 MG tablet Take 500 mg by mouth 2 times daily (with meals)    Historical Provider, MD       Allergies:  Patient has no known allergies. Social History:      The patient currently lives alone    TOBACCO:   reports that he has been smoking Cigarettes. He has a 14.50 pack-year smoking history. He has never used smokeless tobacco.  ETOH:   reports that he drinks alcohol. Family History:      Family History   Problem Relation Age of Onset    Heart Disease Mother     Diabetes Mother     Cancer Mother     Coronary Art Dis Mother     Heart Attack Mother     High Blood Pressure Mother     High Cholesterol Mother     Heart Disease Father     Diabetes Father     COPD Father     Coronary Art Dis Father     Heart Attack Father     High Blood Pressure Father     High Cholesterol Father     Stroke Sister     Prostate Cancer Sister 48    Anemia Daughter        REVIEW OF SYSTEMS:       Constitutional: Negative for appetite change, chills, fatigue and fever. HENT: Negative for congestion, ear pain, rhinorrhea and sore throat. Eyes: Negative for discharge, redness and visual disturbance. Respiratory: Positive for dyspnea. Negative for cough and wheezing, or orthpnea  Cardiovascular: Positive for chest pain. Negative for palpitations and leg swelling. Gastrointestinal: Negative for abdominal pain, diarrhea, nausea and vomiting. Genitourinary: Negative for decreased urine volume, difficulty urinating, dysuria and hematuria. Musculoskeletal: Negative for arthralgias, back pain, joint swelling and neck pain. Skin: Negative for pallor and rash. Allergic/Immunologic: Negative for environmental allergies.    Neurological: Negative for dizziness, syncope, weakness, light-headedness, numbness and headaches. Hematological: Negative for adenopathy. Psychiatric/Behavioral: Negative for confusion and suicidal ideas. The patient is not nervous/anxious. PHYSICAL EXAM:    /79   Pulse 97   Temp 98.6 °F (37 °C)   Resp 16   Ht 5' 9.5\" (1.765 m)   Wt 225 lb (102.1 kg)   SpO2 97%   BMI 32.75 kg/m²     General appearance:  No apparent distress, appears stated age and cooperative. HEENT:  Normal cephalic, atraumatic without obvious deformity. Pupils equal, round, and reactive to light. Extra ocular muscles intact. Conjunctivae/corneas clear. Neck: Supple, with full range of motion. No jugular venous distention. Trachea midline. Respiratory:  Normal respiratory effort. Clear to auscultation, bilaterally without Rales/Wheezes/Rhonchi. Cardiovascular:  Regular rate and rhythm with normal S1/S2 without murmurs, rubs or gallops. Abdomen: Soft, non-tender, non-distended with normal bowel sounds. Musculoskeletal:  No clubbing, cyanosis or edema bilaterally. Full range of motion without deformity. Skin: Skin color, texture, turgor normal.  No rashes or lesions. Neurologic:  Neurovascularly intact without any focal sensory/motor deficits. Cranial nerves: II-XII intact, grossly non-focal.  Psychiatric:  Alert and oriented, thought content appropriate, normal insight  Capillary Refill: Brisk,< 3 seconds   Peripheral Pulses: +2 palpable, equal bilaterally       Labs:     Recent Labs      08/19/18   2350   WBC  12.1*   HGB  16.6   HCT  46.8   PLT  267     Recent Labs      08/19/18   2350   NA  142   K  3.8   CL  100   CO2  26   BUN  14   CREATININE  1.1   CALCIUM  10.1     Recent Labs      08/19/18   2350   AST  16   ALT  16   BILIDIR  <0.2   BILITOT  0.2*   ALKPHOS  78     Recent Labs      08/20/18   0020   INR  0.98     No results for input(s): CKTOTAL, TROPONINI in the last 72 hours.     Urinalysis:      Lab Results   Component Value Date    NITRU NEGATIVE 01/12/2018    WBCUA 0-2 01/12/2018    BACTERIA NONE 01/12/2018    RBCUA NONE 01/12/2018    BLOODU NEGATIVE 01/12/2018    SPECGRAV 1.016 01/12/2018       Intake & Output:  No intake/output data recorded. No intake/output data recorded. Radiology:     CTA Chest W WO Contrast   Final Result    No pulmonary emboli or pulmonary infiltrates. **This report has been created using voice recognition software. It may contain minor errors which are inherent in voice recognition technology. **      Final report electronically signed by Dr. Holley Hamilton on 8/20/2018 2:08 AM      XR CHEST PORTABLE   Final Result   Stable radiographic appearance of the chest. No evidence of an acute process. **This report has been created using voice recognition software. It may contain minor errors which are inherent in voice recognition technology. **      Final report electronically signed by Dr. Holley Hamilton on 8/20/2018 12:56 AM          DVT prophylaxis: [x] Lovenox                                 [] SCDs                                 [] SQ Heparin                                 [] Encourage ambulation           [] Already on Anticoagulation    Code Status: Prior    Disposition:    [x] Home       [] TCU       [] Rehab       [] Psych       [] SNF       [] [place]       [] Other-    ASSESSMENT:    C/Angie Haskins 1106 Problems    Diagnosis Date Noted    Chest pain [R07.9] 01/13/2018     Priority: High     Class: Acute    Anterior chest wall  at site of AICD [R07.89] 01/13/2018     Priority: High     Class: Chronic    Nicotine abuse [Z72.0] 08/20/2018    History of asthma [Z87.09] 08/20/2018    Hx of gastroesophageal reflux (GERD) [Z87.19] 08/20/2018    Ischemic cardiomyopathy EF 35%- later EF 50%- EF now 40% [I25.5] 10/24/2014    S/P ICD (internal cardiac defibrillator) procedure: 10/24/2014: Medtronic Single Chamber [Z95.810] 10/24/2014    Diabetes mellitus type 2 in nonobese ([facility] [address] [E11.9] 10/21/2014    Dyslipidemia [E78.5] 10/21/2014       PLAN:    1. Chest pain, left sided mainly focused around AICD site, similar pain presentation in the past.  Patient identifies medication compliance, however has ran out of SL NTG for the past couple of weeks. Troponin negative no Acute ST or T wave changes on EKG. Cardiology to evaluate. 2. Diarrhea, \"watery non-bloody stool\" prior to admission, if repeated will need to send for further evaluation. 3. Ischemic cardiomyopathy, history  4. Nonobstructive CAD, 3/2017  5. DMT2, controlled, Metformin has been stopped will monitor with ACCU and SSI  6. Essential HPTN, history  7. GERD, history  8. Dyslipidemia, history  9. Nicotine dependency, Habitrol Patch will be provided. 10. Anxiety, history  11. Depression, history        Thank you SARI Montgomery CNP for the opportunity to be involved in this patient's care.     Electronically signed by SARI Mo CNP on 8/20/2018 at 3:36 AM

## 2018-08-20 NOTE — PROGRESS NOTES
Updated Dr Maher Knee about patient still having left sided chest pain. He ordered percocet to see if that would help for he feels it is muscle skeletal pain, and will re-evaluate tomorrow.

## 2018-08-20 NOTE — ED TRIAGE NOTES
Patient presents to the ED with chest pain that has been on going for the last 3 days and has increasingly gotten worse. Pt has a defibrillator pacemaker in place but denies any activation of it recently, patient has cardiac hx and stent placement and a hx of diabetes and HTN. Patient states that he was out of breath walking into the ED. EKG done, IV established.

## 2018-08-20 NOTE — ED PROVIDER NOTES
fraction is visually estimated at 40%.   The left atrium is Mildly dilated. PAST MEDICAL HISTORY     Past Medical History:   Diagnosis Date    Allergic rhinitis     Anxiety     Arthritis     Asthma     CAD (coronary artery disease)     Depression     Diabetes mellitus ([address]     GERD (gastroesophageal reflux disease)     Hyperlipidemia     Hypertension     Insomnia     Ischemic cardiomyopathy 10/24/2014    Pneumonia     S/P ICD (internal cardiac defibrillator) procedure: 10/24/2014: Medtronic Single Chamber 10/24/2014    10/24/2014: Medtronic Single Chamber. Dr. Elin Cueva       Past Surgical History:   Procedure Laterality Date    CORONARY ANGIOPLASTY WITH STENT PLACEMENT  2002?    x2 @ [address] Right 2017    R eye cyst removal    CYST REMOVAL Right 06/21/2017    eye    ELBOW SURGERY Left     PACEMAKER PLACEMENT         CURRENT MEDICATIONS       Previous Medications    ALBUTEROL SULFATE  (90 BASE) MCG/ACT INHALER    Inhale 2 puffs into the lungs every 6 hours as needed for Wheezing    ASPIRIN EC 81 MG EC TABLET    Take 1 tablet by mouth daily    CITALOPRAM (CELEXA) 20 MG TABLET    Take 20 mg by mouth daily.     DICLOFENAC (VOLTAREN) 50 MG EC TABLET    Take 1 tablet by mouth 3 times daily    FLUTICASONE (FLONASE) 50 MCG/ACT NASAL SPRAY    2 sprays by Nasal route daily    ISOSORBIDE MONONITRATE (IMDUR) 120 MG EXTENDED RELEASE TABLET    Take 1 tablet by mouth daily    LISINOPRIL (PRINIVIL;ZESTRIL) 5 MG TABLET    Take 1 tablet by mouth daily    METFORMIN (GLUCOPHAGE) 500 MG TABLET    Take 500 mg by mouth 2 times daily (with meals)    METOPROLOL TARTRATE (LOPRESSOR) 50 MG TABLET    Take 1 tablet by mouth 2 times daily    NAPROXEN (NAPROSYN) 500 MG TABLET    Take 500 mg by mouth 2 times daily as needed for Pain    OMEPRAZOLE (PRILOSEC) 40 MG DELAYED RELEASE CAPSULE    Take 40 mg by mouth daily    PRASUGREL (EFFIENT) 10 MG TABS    Take 1 tablet by FUNCTION PANEL - Abnormal; Notable for the following: Total Bilirubin 0.2 (*)     All other components within normal limits   D-DIMER, QUANTITATIVE - Abnormal; Notable for the following:     D-Dimer, Quant 509.00 (*)     All other components within normal limits   GLOMERULAR FILTRATION RATE, ESTIMATED - Abnormal; Notable for the following:     Est, Glom Filt Rate 86 (*)     All other components within normal limits   LIPASE   MAGNESIUM   BRAIN NATRIURETIC PEPTIDE   PROTIME-INR   TROPONIN   ANION GAP   OSMOLALITY       All other labs were within normal range or not returned as of this dictation. EMERGENCY DEPARTMENT COURSE and Medical Decision Making:     ED Course as of Aug 20 0340   Mon Aug 20, 2018   [address] [place] [address] Hospitalist consulted and accepted for admission. Requested I spot NTG gtt and change to paste. [KJ]      ED Course User Index  [KJ] SARI Mckinney - CNP     Heart Score for chest pain patients  History: Highly Suspicious  ECG: Normal  Patient Age: > 39 and < 65 years  *Risk factors for Atherosclerotic disease: Cigarette smoking, Diabetes Mellitus, Hypercholesterolemia, Hypertension, Positive family History, Coronary Artery Disease  Risk Factors: > 3 Risk factors or history of atherosclerotic disease*  Troponin: < 1X normal limit  Heart Score Total: 5      Pt was seen and evaluated in the ER for chest pain, worse with exertion. He has an extensive history of cardiomyopathy with a low EF and a defib. His pain improved with NTG. CTA was obtained to r/o Embolism and it is negative. The patient is high risk with a heart score of 5. His cardiac follow up has been limited due to his non-compliance according to the notes in his history. Will admit the patient for further evaluation. Hospitalist agrees to admit.       ED Medications administered this visit:    Medications   nitroGLYCERIN (NITROSTAT) SL tablet 0.4 mg (0.4 mg Sublingual Given 8/20/18 0017)   nitroglycerin (NITRO-BID) 2 % ointment

## 2018-08-20 NOTE — PROGRESS NOTES
Pt arrived in 4K 1 from ED. Complaints: Chest pain / discomfort. IV normal saline infusing into the forearm right, condition patent and no redness at a rate of 20 mls/ hour with about 50 mls remaining in the bag. The best day to schedule a follow up Dr appointment is: Wednesday p.m.       The patient is interested in [facility]. [facility] to [facility] program?:  No

## 2018-08-20 NOTE — CONSULTS
[address] [place] [address]                                   CONSULTATION    PATIENT NAME: Asiya Skinner                 :        1970  MED REC NO:   [id number]                           ROOM:       0001  ACCOUNT NO:   [de-identified]                           ADMIT DATE: 2018  PROVIDER:     Helene Dos Santos M.D.    [facility] DATE:  2018    CARDIOLOGY CONSULT    REASON FOR CONSULTATION:  Chest pain. REQUESTING PROVIDER:  Hospitalist service. HISTORY OF PRESENT ILLNESS:  This is a very pleasant 74-year-old gentleman  with a history of cardiomyopathy, history of cardiac catheterization done  about a year ago showing nonobstructive disease. The patient usually  follows with Dr. Shaji Marks, comes in to the hospital with symptoms of chest  discomfort. He describes the symptoms of chest pain that is sharp type and  not relieved with nitroglycerin. He does have what he describes as  worsening shortness of breath. He comes in to the hospital for evaluation. Initial assessment with EKG and cardiac enzymes showed no acute ST  elevation or acute changes. The patient's cardiac enzymes were also  negative. The patient did have a stress test with Dr. Olivia Hearn in 2018  which showed no ischemia and was planned to be treated medically based on  that. The patient's symptoms of chest pain are not triggered or relieved by  anything in particular. Looks like he has had chronic intermittent chest  pain and he describes the pain to be mainly on the area of the generator of  the pacemaker. REVIEW OF SYSTEMS:  No fever, no chills, no weight loss. No hematuria or  dysuria. No abdominal pain, nausea, vomiting, or diarrhea. No obvious  psych problems. Questionable underlying anxiety. No skin rashes. No  dizziness, lightheadedness, or loss of consciousness. No recent trauma. No bleeding problem.   No HEENT-related the patient continues  to do well with medical therapy, he will be discharged home to follow with  Dr. Reji Rod as an outpatient. Thank you for allowing me to participate in his care.         Chuy Momin M.D.    D: 08/20/2018 15:03:53       T: 08/20/2018 18:05:32     [email or web address]  Job#: [id number]     Doc#: [id number]    CC:

## 2018-08-20 NOTE — CARE COORDINATION
Arrangements:  Spouse/Significant Other   Support Systems:  Spouse/Significant Other  Current Services PTA:     Potential Assistance Needed:  N/A  Potential Assistance Purchasing Medications:  No  Does patient want to participate in local refill/ meds to beds program?  No  Type of Home Care Services:  None  Patient expects to be discharged to:  Private residence  Expected Discharge date:  08/21/18  Follow Up Appointment: Best Day/ Time: Wednesday PM    Discharge Plan: met with client; denied needs, plans home alone independently as PTA     Evaluation: no    8/20/18, 10:18 AM    Discharge plan discussed by  and . Discharge plan reviewed with patient/ family. Patient/ family verbalize understanding of discharge plan and are in agreement with plan. Understanding was demonstrated using the teach back method.

## 2018-08-20 NOTE — ED NOTES
Patient states that the pain has decreased some what and is now rating the pain a 6/10.      Zannie Fabry, RN  08/20/18 8023

## 2018-08-21 VITALS
RESPIRATION RATE: 18 BRPM | TEMPERATURE: 97.8 F | SYSTOLIC BLOOD PRESSURE: 112 MMHG | DIASTOLIC BLOOD PRESSURE: 63 MMHG | OXYGEN SATURATION: 97 % | WEIGHT: 218.7 LBS | BODY MASS INDEX: 31.31 KG/M2 | HEIGHT: 70 IN | HEART RATE: 76 BPM

## 2018-08-21 LAB
GLUCOSE BLD-MCNC: 115 MG/DL (ref 70–108)
GLUCOSE BLD-MCNC: 191 MG/DL (ref 70–108)

## 2018-08-21 PROCEDURE — 96372 THER/PROPH/DIAG INJ SC/IM: CPT

## 2018-08-21 PROCEDURE — 2580000003 HC RX 258: Performed by: NURSE PRACTITIONER

## 2018-08-21 PROCEDURE — 6370000000 HC RX 637 (ALT 250 FOR IP): Performed by: NURSE PRACTITIONER

## 2018-08-21 PROCEDURE — G0378 HOSPITAL OBSERVATION PER HR: HCPCS

## 2018-08-21 PROCEDURE — 6370000000 HC RX 637 (ALT 250 FOR IP): Performed by: NUCLEAR MEDICINE

## 2018-08-21 PROCEDURE — 99213 OFFICE O/P EST LOW 20 MIN: CPT | Performed by: NURSE PRACTITIONER

## 2018-08-21 PROCEDURE — 6370000000 HC RX 637 (ALT 250 FOR IP): Performed by: INTERNAL MEDICINE

## 2018-08-21 PROCEDURE — 94640 AIRWAY INHALATION TREATMENT: CPT

## 2018-08-21 PROCEDURE — 82948 REAGENT STRIP/BLOOD GLUCOSE: CPT

## 2018-08-21 PROCEDURE — 99217 PR OBSERVATION CARE DISCHARGE MANAGEMENT: CPT | Performed by: INTERNAL MEDICINE

## 2018-08-21 PROCEDURE — 6360000002 HC RX W HCPCS: Performed by: NURSE PRACTITIONER

## 2018-08-21 RX ORDER — NITROGLYCERIN 0.4 MG/1
TABLET SUBLINGUAL
Qty: 25 TABLET | Refills: 3 | Status: SHIPPED | OUTPATIENT
Start: 2018-08-21

## 2018-08-21 RX ORDER — LISINOPRIL 5 MG/1
5 TABLET ORAL DAILY
Qty: 90 TABLET | Refills: 2 | Status: SHIPPED | OUTPATIENT
Start: 2018-08-21

## 2018-08-21 RX ORDER — METOPROLOL TARTRATE 50 MG/1
50 TABLET, FILM COATED ORAL 2 TIMES DAILY
Status: DISCONTINUED | OUTPATIENT
Start: 2018-08-21 | End: 2018-08-21 | Stop reason: HOSPADM

## 2018-08-21 RX ORDER — ISOSORBIDE MONONITRATE 30 MG/1
30 TABLET, EXTENDED RELEASE ORAL DAILY
Qty: 30 TABLET | Refills: 3 | Status: SHIPPED | OUTPATIENT
Start: 2018-08-22 | End: 2019-12-13 | Stop reason: SDUPTHER

## 2018-08-21 RX ORDER — ISOSORBIDE MONONITRATE 30 MG/1
30 TABLET, EXTENDED RELEASE ORAL DAILY
Status: DISCONTINUED | OUTPATIENT
Start: 2018-08-21 | End: 2018-08-21 | Stop reason: HOSPADM

## 2018-08-21 RX ORDER — TRIAMTERENE AND HYDROCHLOROTHIAZIDE 37.5; 25 MG/1; MG/1
0.5 TABLET ORAL DAILY
Qty: 45 TABLET | Refills: 1 | Status: SHIPPED | OUTPATIENT
Start: 2018-08-21 | End: 2021-04-06

## 2018-08-21 RX ORDER — METOPROLOL TARTRATE 50 MG/1
50 TABLET, FILM COATED ORAL 2 TIMES DAILY
Qty: 180 TABLET | Refills: 3 | Status: SHIPPED | OUTPATIENT
Start: 2018-08-21 | End: 2019-12-13 | Stop reason: SDUPTHER

## 2018-08-21 RX ORDER — OMEPRAZOLE 40 MG/1
40 CAPSULE, DELAYED RELEASE ORAL DAILY
Qty: 30 CAPSULE | Refills: 3 | Status: SHIPPED | OUTPATIENT
Start: 2018-08-21

## 2018-08-21 RX ORDER — RANOLAZINE 1000 MG/1
1000 TABLET, EXTENDED RELEASE ORAL 2 TIMES DAILY
Qty: 90 TABLET | Refills: 1 | Status: SHIPPED | OUTPATIENT
Start: 2018-08-21 | End: 2019-12-13 | Stop reason: SDUPTHER

## 2018-08-21 RX ORDER — PRASUGREL 10 MG/1
10 TABLET, FILM COATED ORAL DAILY
Qty: 90 TABLET | Refills: 3 | Status: SHIPPED | OUTPATIENT
Start: 2018-08-21 | End: 2019-04-16

## 2018-08-21 RX ADMIN — METOPROLOL TARTRATE 50 MG: 50 TABLET, FILM COATED ORAL at 13:41

## 2018-08-21 RX ADMIN — ENOXAPARIN SODIUM 40 MG: 40 INJECTION SUBCUTANEOUS at 08:17

## 2018-08-21 RX ADMIN — Medication 10 ML: at 08:33

## 2018-08-21 RX ADMIN — OXYCODONE HYDROCHLORIDE AND ACETAMINOPHEN 1 TABLET: 5; 325 TABLET ORAL at 03:14

## 2018-08-21 RX ADMIN — ISOSORBIDE MONONITRATE 30 MG: 30 TABLET ORAL at 13:41

## 2018-08-21 RX ADMIN — ASPIRIN 81 MG: 81 TABLET, COATED ORAL at 08:17

## 2018-08-21 RX ADMIN — PRASUGREL HYDROCHLORIDE 10 MG: 10 TABLET, FILM COATED ORAL at 08:16

## 2018-08-21 RX ADMIN — TRIAMTERENE AND HYDROCHLOROTHIAZIDE 0.5 TABLET: 37.5; 25 TABLET ORAL at 08:16

## 2018-08-21 RX ADMIN — INSULIN LISPRO 3 UNITS: 100 INJECTION, SOLUTION INTRAVENOUS; SUBCUTANEOUS at 11:55

## 2018-08-21 RX ADMIN — CITALOPRAM 20 MG: 20 TABLET, FILM COATED ORAL at 08:17

## 2018-08-21 RX ADMIN — FLUTICASONE PROPIONATE 2 SPRAY: 50 SPRAY, METERED NASAL at 08:17

## 2018-08-21 RX ADMIN — PANTOPRAZOLE SODIUM 40 MG: 40 TABLET, DELAYED RELEASE ORAL at 06:01

## 2018-08-21 RX ADMIN — INSULIN GLARGINE 10 UNITS: 100 INJECTION, SOLUTION SUBCUTANEOUS at 08:18

## 2018-08-21 RX ADMIN — OXYCODONE HYDROCHLORIDE AND ACETAMINOPHEN 1 TABLET: 5; 325 TABLET ORAL at 11:58

## 2018-08-21 RX ADMIN — TIOTROPIUM BROMIDE 18 MCG: 18 CAPSULE ORAL; RESPIRATORY (INHALATION) at 09:43

## 2018-08-21 RX ADMIN — RANOLAZINE 1000 MG: 500 TABLET, FILM COATED, EXTENDED RELEASE ORAL at 08:16

## 2018-08-21 RX ADMIN — LISINOPRIL 5 MG: 5 TABLET ORAL at 08:17

## 2018-08-21 ASSESSMENT — PAIN DESCRIPTION - FREQUENCY: FREQUENCY: CONTINUOUS

## 2018-08-21 ASSESSMENT — PAIN DESCRIPTION - PROGRESSION: CLINICAL_PROGRESSION: GRADUALLY IMPROVING

## 2018-08-21 ASSESSMENT — PAIN SCALES - GENERAL
PAINLEVEL_OUTOF10: 5
PAINLEVEL_OUTOF10: 4
PAINLEVEL_OUTOF10: 2
PAINLEVEL_OUTOF10: 3

## 2018-08-21 ASSESSMENT — PAIN DESCRIPTION - DESCRIPTORS
DESCRIPTORS: SHARP
DESCRIPTORS: SHARP

## 2018-08-21 ASSESSMENT — PAIN DESCRIPTION - PAIN TYPE
TYPE: ACUTE PAIN
TYPE: ACUTE PAIN

## 2018-08-21 ASSESSMENT — PAIN DESCRIPTION - ORIENTATION
ORIENTATION: LEFT;UPPER
ORIENTATION: LEFT;UPPER

## 2018-08-21 ASSESSMENT — PAIN DESCRIPTION - LOCATION
LOCATION: CHEST
LOCATION: CHEST

## 2018-08-21 NOTE — DISCHARGE INSTR - DIET
 Good nutrition is important when healing from an illness, injury, or surgery. Follow any nutrition recommendations given to you during your hospital stay.  If you were given an oral nutrition supplement while in the hospital, continue to take this supplement at home. You can take it with meals, in-between meals, and/or before bedtime. These supplements can be purchased at most local grocery stores, pharmacies, and chain super-stores.  If you have any questions about your diet or nutrition, call the hospital and ask for the dietitian. Healthy Eating habits help your heart health. Below are general guidlelines for heart healthy choices:    Eat fruits and vegetables. They are loaded with vitamins, minerals and fiber. Eat a variety every day. .   Eat a variety of whole grain products every day. They contain a lot of fiber and nutrients. Examples of whole grains include oats, whole wheat bread, and brown rice. Eat fish at least 2 times each week. Oily fish, which contain omega-3 fatty acids, are best for your heart. These fish include tuna, salmon, mackerel, lake trout, herring, and sardines. Limit saturated fat and cholesterol. To limit saturated fats and cholesterol, try to choose the following foods:   Lean meats and meat alternatives (chicken, fish, turkey, beans, and nuts)  Nonfat and low-fat dairy products (skim or 1% milk, low fat yogurt)  Unsaturated fats, like canola and olive oils instead of saturated fats, such as butter  Read food labels and limit the amount of trans fat you eat. Trans fat raises cholesterol. It is found in many processed foods made with shortening or with partially hydrogenated vegetable oils. These foods include cookies, crackers, chips, and many snack foods. Choose snacks with \"0\" grams of Trans fat. Choose healthy fats. Unsaturated fats, such as olive, canola and peanut oils, and nuts are part of a healthy diet.  But all fats are high in calories, so limit your serving sizes. Limit salt/sodium. Choose and prepare foods with little or no salt. Use pepper or Mrs. Dash for added flavor. Limit high sodium foods like canned soups, processed meats and cheeses, and salty snack foods. Limit beverages and food with added sugars (such as regular pop, sweetened iced tea, desserts and sweets). These foods are loaded with calories but provide very little nutrients. If you drink alcohol, drink in moderation. Limit alcohol intake to 2 drinks a day for men and 1 drink a day for women.  Check with your Doctor first.

## 2018-08-21 NOTE — PROGRESS NOTES
Cardiology Progress Note      Patient:  Estefanía Elam  YOB: 1970  MRN: [id number]   Acct: [de-identified]  [facility] Date:  8/19/2018  Primary Cardiologist: Dr. Alfonso Saleh  Seen by Dr. Rodri Gibson    Per prior cardiology consult note-  REASON FOR CONSULTATION:  Chest pain.     REQUESTING PROVIDER:  Hospitalist service.     HISTORY OF PRESENT ILLNESS:  This is a very pleasant 34-year-old gentleman  with a history of cardiomyopathy, history of cardiac catheterization done  about a year ago showing nonobstructive disease. The patient usually  follows with Dr. Alfonso Saleh, comes in to the hospital with symptoms of chest  discomfort. He describes the symptoms of chest pain that is sharp type and  not relieved with nitroglycerin. He does have what he describes as  worsening shortness of breath. He comes in to the hospital for evaluation. Initial assessment with EKG and cardiac enzymes showed no acute ST  elevation or acute changes. The patient's cardiac enzymes were also  negative. The patient did have a stress test with Dr. Georges Mora in 01/2018  which showed no ischemia and was planned to be treated medically based on  that. The patient's symptoms of chest pain are not triggered or relieved by  anything in particular.   Looks like he has had chronic intermittent chest  pain and he describes the pain to be mainly on the area of the generator of  the pacemaker.       Subjective (Events in last 24 hours):     Pt states his chest pain has been worse this last week than normal - it is Lt sided sharp and stabbing with radiation to the neck area - no associated nausea or diaphoresis     Not particularly relieved with sl nitro per pt - improved more with pain medication   This chest pain has no reproducible component    Pt states he is compliant with cardiac meds    He failed to show for OP scheduled cath - his reason was that he was in [facility] and he hasn't rescheduled yet       He has no pain at present   VSS   tele - SR no ectopy         bjective:   /63   Pulse 76   Temp 97.8 °F (36.6 °C) (Oral)   Resp 18   Ht 5' 9.5\" (1.765 m)   Wt 218 lb 11.2 oz (99.2 kg)   SpO2 97%   BMI 31.83 kg/m²        TELEMETRY: SR    Physical Exam:  General Appearance: alert and oriented to person, place and time, in no acute distress  Cardiovascular: normal rate, regular rhythm, normal S1 and S2, no murmurs, rubs, clicks, or gallops, distal pulses intact,  Pulmonary/Chest: clear to auscultation bilaterally- no wheezes, rales or rhonchi, normal air movement, no respiratory distress  Abdomen: soft, non-tender, non-distended, normal bowel sounds, no masses Extremities: no cyanosis, clubbing or edema, pulses present   Skin: warm and dry, no rash or erythema  Head: normocephalic and atraumatic   Musculoskeletal: normal range of motion, no joint swelling, deformity or tenderness  Neurological: alert, oriented, normal speech, no focal findings or movement disorder noted    Medications:    aspirin EC  81 mg Oral Daily    citalopram  20 mg Oral Daily    fluticasone  2 spray Nasal Daily    lisinopril  5 mg Oral Daily    metoprolol tartrate  50 mg Oral BID    prasugrel  10 mg Oral Daily    ranolazine  1,000 mg Oral BID    tiotropium  18 mcg Inhalation Daily    traZODone  150 mg Oral Nightly    triamterene-hydrochlorothiazide  0.5 tablet Oral Daily    sodium chloride flush  10 mL Intravenous 2 times per day    enoxaparin  40 mg Subcutaneous Daily    nicotine  1 patch Transdermal Daily    pantoprazole  40 mg Oral QAM AC    insulin lispro  0-18 Units Subcutaneous TID WC    insulin lispro  0-9 Units Subcutaneous Nightly    insulin glargine  10 Units Subcutaneous BID      dextrose         albuterol sulfate HFA 2 puff Q6H PRN   sodium chloride flush 10 mL PRN   docusate sodium 100 mg BID PRN   ondansetron 4 mg Q8H PRN   glucose 15 g PRN   dextrose 12.5 g PRN   glucagon (rDNA) 1 mg PRN   dextrose 100 mL/hr PRN   acetaminophen 650 mg Q4H PRN oxyCODONE-acetaminophen 1 tablet Q6H PRN   nitroGLYCERIN 0.4 mg Q5 Min PRN       Diagnostics:  EK-AUG-2018 23:38:53 [facility] ROUTINE RETRIEVAL  Sinus tachycardia with occasional Premature ventricular complexes  Otherwise normal ECG  When compared with ECG of 2018 09:38,  Premature ventricular complexes are now Present  Nonspecific T wave abnormality is worse in Inferior leads    Echo:   Electronically signed by Ignacio Valdez MD (Interpreting   physician) on 2017 at 09:26 PM   ----------------------------------------------------------------      Findings      Mitral Valve   The mitral valve structure was normal with normal leaflet separation.   DOPPLER: The transmitral velocity was within the normal range with no   evidence for mitral stenosis. There was no evidence of mitral   regurgitation.      Aortic Valve   The aortic valve was trileaflet with normal thickness and cuspal   separation. DOPPLER: Transaortic velocity was within the normal range with   no evidence of aortic stenosis. There was no evidence of aortic   regurgitation.      Tricuspid Valve   The tricuspid valve structure was normal with normal leaflet separation.   DOPPLER: There was no evidence of tricuspid stenosis. There was no   evidence of tricuspid regurgitation.      Pulmonic Valve   The pulmonic valve leaflets exhibited normal thickness, no calcification,   and normal cuspal separation. DOPPLER: The transpulmonic velocity was   within the normal range with no evidence for regurgitation.      Left Atrium   The left atrium is Mildly dilated.      Left Ventricle   Left ventricle size is normal.   Normal left ventricular wall thickness.   There was moderate global hypokinesis of the left ventricle.   Systolic function was moderately reduced.   Ejection fraction is visually estimated at 40%.       Right Atrium   Right atrial size was normal.      Right Ventricle   The right ventricular size was normal with normal systolic function and   wall thickness.      Pericardial Effusion   The pericardium was normal in appearance with no evidence of a pericardial   effusion.      Pleural Effusion   No evidence of pleural effusion.      Aorta / Great Vessels   -Aortic root dimension within normal limits.   -The Pulmonary artery is within normal limits.   -IVC size is within normal limits with normal respiratory phasic changes    Stress: 1/2018 [facility]  Stress portion of Stress negative    Nuclear - not in epic      Left Heart Cath:   Procedure Summary  LM-P, LAD MID 20% DISTAL 30% MYOCARDIAL BRIDGING, LCX OSTIAL 40% MID 40%, RAMUS PROX 30% ,  RCA MID 20% , DISTAL RCA HAS STENT AND THERE 55 TO 60% ISR, NAHEED III FLOW  Severely reduced LV systolic function. Severe global hypokinesis observed. LVEF approximately 15% . LV size - severely dialated. EDP 12 mmhg  Recommendations  Continue with aggressive risk factor modification and medical therapy. CONSIDER CUTTING POBA OF THE DISTAL RCA ISR IF REMAIN SYMPTOMATIC DESPITE MED RX AND AFTER  ELBOW SURGERY    Estimated Blood Loss: 5 ml. Complications: No complications. Signatures  Electronically signed by Kelsey Escoto MD (Performing Physician) on 03/15/2017    Lab Data:    Cardiac Enzymes:  No results for input(s): CKTOTAL, CKMB, CKMBINDEX, TROPONINI in the last 72 hours.     CBC:   Lab Results   Component Value Date    WBC 12.1 08/19/2018    RBC 5.33 08/19/2018    HGB 16.6 08/19/2018    HCT 46.8 08/19/2018     08/19/2018       CMP:  Lab Results   Component Value Date     08/19/2018    K 3.8 08/19/2018    K 4.2 01/12/2018     08/19/2018    CO2 26 08/19/2018    BUN 14 08/19/2018    CREATININE 1.1 08/19/2018    LABGLOM 86 08/19/2018    GLUCOSE 161 08/19/2018    CALCIUM 10.1 08/19/2018       Hepatic Function Panel:  Lab Results   Component Value Date    ALKPHOS 78 08/19/2018    ALT 16 08/19/2018    AST 16 08/19/2018    PROT 7.9 08/19/2018    BILITOT 0.2 08/19/2018    BILIDIR

## 2018-08-21 NOTE — PROGRESS NOTES
CLINICAL PHARMACY: DISCHARGE MED RECONCILIATION/REVIEW    [facility]) Select Patient?: No  Total # of Interventions Recommended: 0   -   Total # Interventions Accepted: 0  Intervention Severity:   - Level 1 Intervention Present?: No   - Level 2 #: 0   - Level 3 #: 0   Time Spent (min): 30    Additional Documentation:  Discharge medications reviewed.      Cuong Bush, PharmD, [facility]  8/21/2018 3:41 PM

## 2018-08-21 NOTE — DISCHARGE SUMMARY
Hospital Medicine Discharge Summary      Patient Identification:   Keely Carias   : 1970  MRN: [id number]   Account: [de-identified]      Patient's PCP: Jayy Freedman. SARI Rosario - CNP    Admit Date: 2018     Discharge Date:   18      Admitting Physician: Anup Barfield MD     Discharge Physician: Maria Elena Jackson MD     Discharge Diagnoses: Active Hospital Problems    Diagnosis Date Noted    Chest pain [R07.9] 2018     Priority: High     Class: Acute    Anterior chest wall  at site of AICD [R07.89] 2018     Priority: High     Class: Chronic    Nicotine abuse [Z72.0] 2018    History of asthma [Z87.09] 2018    Hx of gastroesophageal reflux (GERD) [Z87.19] 2018    Marijuana use [F12.90] 2018    Ischemic cardiomyopathy EF 35%- later EF 50%- EF now 40% [I25.5] 10/24/2014    S/P ICD (internal cardiac defibrillator) procedure: 10/24/2014: Medtronic Single Chamber [Z95.810] 10/24/2014    Dyslipidemia [E78.5] 10/21/2014    Unstable angina ([facility] [address] [I20.0] 10/21/2014    Type 2 diabetes mellitus with hyperglycemia, without long-term current use of insulin ([facility] [address] [E11.65] 10/21/2014    CAD (coronary artery disease), S/P stent to RCA=-  [I25.10]        The patient was seen and examined on day of discharge and this discharge summary is in conjunction with any daily progress note from day of discharge. Hospital Course: Keely Carias is a 51 y.o. male admitted to [address] [place] [address] on 2018 for chest pain d/t suspected unstable agnina vs musculoskeletal. ACS r/o by negative enzymes and EKG. Seen by cardiology . Stable for home d/c to day.         Exam:     Vitals:  Vitals:    18 2346 18 0308 18 0816 18 1117   BP: 123/70 (!) 111/59 (!) 103/51 112/63   Pulse: 77 70 76 76   Resp: 18 18 16 18   Temp: 98.1 °F (36.7 °C) 97.9 °F (36.6 °C) 97.6 °F (36.4 °C) 97.8 °F (36.6 °C)   TempSrc: Oral Oral Oral Oral   SpO2: 97% 93%  97%   Weight:  218 lb 11.2 oz (99.2 kg)     Height:         Weight: Weight: 218 lb 11.2 oz (99.2 kg)     24 hour intake/output:    Intake/Output Summary (Last 24 hours) at 08/21/18 1509  Last data filed at 08/21/18 1437   Gross per 24 hour   Intake          3721.49 ml   Output             3350 ml   Net           371.49 ml       General appearance:  No apparent distress, appears stated age and cooperative. Respiratory:  Normal respiratory effort. Clear to auscultation  bilaterally-no rales or rhonchi. Cardiovascular:  Regular rate and rhythm with normal S1/S2,no murmurs. Abdomen: Soft, non-tender, non-distended with normal bowel sounds. Musculoskeletal:  No joint swellings or deformities ,no b/l  LE edema. No limited  range of joint motions. Neurologic: Non focal neuro exam , intact sensation and no cranial nerves deficits. Labs: For convenience and continuity at follow-up the following most recent labs are provided:      CBC:    Lab Results   Component Value Date    WBC 12.1 08/19/2018    HGB 16.6 08/19/2018    HCT 46.8 08/19/2018     08/19/2018       Renal:    Lab Results   Component Value Date     08/19/2018    K 3.8 08/19/2018    K 4.2 01/12/2018     08/19/2018    CO2 26 08/19/2018    BUN 14 08/19/2018    CREATININE 1.1 08/19/2018    CALCIUM 10.1 08/19/2018     MICROBIOLOGY & HISTOPATHOLOGY. None.     TOXICOLOGY. Results for Moraima Bojorquez (MRN [id number]) as of 8/20/2018 15:40    Ref.  Range 1/12/2018 22:16   AMPHETAMINE+METHAMPHETAMINE URINE SCREEN Latest Ref Range: NEGATIVE  Negative   Barbiturate Quant, Ur Latest Ref Range: NEGATIVE  Negative   Benzodiazepine Quant, Ur Latest Ref Range: NEGATIVE  Negative   Cannabinoid Quant, Ur Latest Ref Range: NEGATIVE  POSITIVE   Cocaine Metab Quant, Ur Latest Ref Range: NEGATIVE  Negative   PCP Quant, Ur Latest Ref Range: NEGATIVE  Negative   Opiates, Urine Latest Ref Range: NEGATIVE  Negative         ENDOSCOPE

## 2018-08-22 LAB
EKG ATRIAL RATE: 105 BPM
EKG P AXIS: 84 DEGREES
EKG P-R INTERVAL: 166 MS
EKG Q-T INTERVAL: 326 MS
EKG QRS DURATION: 72 MS
EKG QTC CALCULATION (BAZETT): 430 MS
EKG R AXIS: 76 DEGREES
EKG T AXIS: 52 DEGREES
EKG VENTRICULAR RATE: 105 BPM

## 2018-08-22 PROCEDURE — 93010 ELECTROCARDIOGRAM REPORT: CPT | Performed by: INTERNAL MEDICINE

## 2018-08-31 ENCOUNTER — TELEPHONE (OUTPATIENT)
Dept: CARDIOLOGY CLINIC | Age: 48
End: 2018-08-31

## 2018-08-31 NOTE — LETTER
[facility] Card[facility]  [facility]  [place] [address]  Phone: [phone]  Fax: [phone]      August 31, 2018     0 [facility]      Dear Lex :    I am writing you because I have been informed of your missed appointment(s). We care about you and the management of your healthcare and want to make sure that you follow up as recommended. We're sorry you were unable to keep your appointment and hope that you are doing well. We would like to continue treating your healthcare needs. Please call the office to let us know your plans for treatment and to reschedule your appointment.      Sincerely,        Daniel Hanks MD

## 2018-09-13 ENCOUNTER — PROCEDURE VISIT (OUTPATIENT)
Dept: CARDIOLOGY CLINIC | Age: 48
End: 2018-09-13
Payer: MEDICAID

## 2018-09-13 DIAGNOSIS — Z95.810 S/P ICD (INTERNAL CARDIAC DEFIBRILLATOR) PROCEDURE: Primary | ICD-10-CM

## 2018-09-13 PROCEDURE — 93296 REM INTERROG EVL PM/IDS: CPT | Performed by: INTERNAL MEDICINE

## 2018-09-13 PROCEDURE — 93295 DEV INTERROG REMOTE 1/2/MLT: CPT | Performed by: INTERNAL MEDICINE

## 2018-09-13 NOTE — PROGRESS NOTES
Josefa pt   8.3 years on device  RV imped 399 shock 76  R waves 15.3  0% paced   SVT     Compliance issues, has been dismissed x2 from the clinic

## 2018-09-13 NOTE — TELEPHONE ENCOUNTER
Patient no showed appointment 3-18-18. Left message on patients machine to call our office back to reschedule. Letter printed and sent.
Normal rate, regular rhythm.  Heart sounds S1, S2.  No murmurs, rubs or gallops.

## 2018-12-08 ENCOUNTER — HOSPITAL ENCOUNTER (EMERGENCY)
Age: 48
Discharge: HOME | End: 2018-12-08
Payer: MEDICAID

## 2018-12-08 VITALS
HEART RATE: 104 BPM | DIASTOLIC BLOOD PRESSURE: 87 MMHG | RESPIRATION RATE: 16 BRPM | WEIGHT: 214 LBS | SYSTOLIC BLOOD PRESSURE: 131 MMHG | BODY MASS INDEX: 31.7 KG/M2 | OXYGEN SATURATION: 98 % | TEMPERATURE: 97.2 F | HEIGHT: 69 IN

## 2018-12-08 DIAGNOSIS — K08.89 ODONTALGIA: Primary | ICD-10-CM

## 2018-12-08 DIAGNOSIS — K02.9 DENTAL CARIES: ICD-10-CM

## 2018-12-08 PROCEDURE — 99213 OFFICE O/P EST LOW 20 MIN: CPT | Performed by: NURSE PRACTITIONER

## 2018-12-08 PROCEDURE — 99212 OFFICE O/P EST SF 10 MIN: CPT

## 2018-12-08 RX ORDER — AMOXICILLIN 500 MG/1
500 CAPSULE ORAL 3 TIMES DAILY
Qty: 30 CAPSULE | Refills: 0 | Status: SHIPPED | OUTPATIENT
Start: 2018-12-08 | End: 2018-12-18

## 2018-12-08 RX ORDER — NAPROXEN 500 MG/1
500 TABLET ORAL 2 TIMES DAILY WITH MEALS
Qty: 20 TABLET | Refills: 0 | Status: SHIPPED | OUTPATIENT
Start: 2018-12-08 | End: 2019-12-22 | Stop reason: ALTCHOICE

## 2018-12-08 RX ORDER — TRAMADOL HYDROCHLORIDE 50 MG/1
50 TABLET ORAL EVERY 6 HOURS PRN
Qty: 12 TABLET | Refills: 0 | Status: SHIPPED | OUTPATIENT
Start: 2018-12-08 | End: 2018-12-11

## 2018-12-08 ASSESSMENT — PAIN DESCRIPTION - PROGRESSION: CLINICAL_PROGRESSION: GRADUALLY WORSENING

## 2018-12-08 ASSESSMENT — PAIN DESCRIPTION - ORIENTATION: ORIENTATION: LEFT;UPPER

## 2018-12-08 ASSESSMENT — PAIN DESCRIPTION - LOCATION: LOCATION: TEETH

## 2018-12-08 ASSESSMENT — PAIN DESCRIPTION - FREQUENCY: FREQUENCY: CONTINUOUS

## 2018-12-08 ASSESSMENT — PAIN DESCRIPTION - ONSET: ONSET: GRADUAL

## 2018-12-08 ASSESSMENT — PAIN DESCRIPTION - PAIN TYPE: TYPE: ACUTE PAIN

## 2018-12-08 ASSESSMENT — PAIN SCALES - GENERAL: PAINLEVEL_OUTOF10: 10

## 2018-12-08 ASSESSMENT — PAIN DESCRIPTION - DESCRIPTORS: DESCRIPTORS: THROBBING

## 2019-01-02 ENCOUNTER — PROCEDURE VISIT (OUTPATIENT)
Dept: CARDIOLOGY CLINIC | Age: 49
End: 2019-01-02
Payer: MEDICAID

## 2019-01-02 DIAGNOSIS — Z95.810 S/P ICD (INTERNAL CARDIAC DEFIBRILLATOR) PROCEDURE: Primary | ICD-10-CM

## 2019-01-02 PROCEDURE — 93296 REM INTERROG EVL PM/IDS: CPT | Performed by: INTERNAL MEDICINE

## 2019-01-02 PROCEDURE — 93295 DEV INTERROG REMOTE 1/2/MLT: CPT | Performed by: INTERNAL MEDICINE

## 2019-02-27 ENCOUNTER — TELEPHONE (OUTPATIENT)
Dept: CARDIOLOGY CLINIC | Age: 49
End: 2019-02-27

## 2019-03-08 ENCOUNTER — TELEPHONE (OUTPATIENT)
Dept: CARDIOLOGY CLINIC | Age: 49
End: 2019-03-08

## 2019-04-16 ENCOUNTER — HOSPITAL ENCOUNTER (EMERGENCY)
Age: 49
Discharge: HOME | End: 2019-04-16
Payer: MEDICAID

## 2019-04-16 VITALS
SYSTOLIC BLOOD PRESSURE: 123 MMHG | BODY MASS INDEX: 30.72 KG/M2 | OXYGEN SATURATION: 99 % | TEMPERATURE: 97.3 F | RESPIRATION RATE: 16 BRPM | HEART RATE: 92 BPM | DIASTOLIC BLOOD PRESSURE: 72 MMHG | WEIGHT: 208 LBS

## 2019-04-16 DIAGNOSIS — J06.9 VIRAL URI: ICD-10-CM

## 2019-04-16 DIAGNOSIS — Z87.09 HISTORY OF ASTHMA: ICD-10-CM

## 2019-04-16 DIAGNOSIS — H10.33 ACUTE BACTERIAL CONJUNCTIVITIS OF BOTH EYES: Primary | ICD-10-CM

## 2019-04-16 PROCEDURE — 99214 OFFICE O/P EST MOD 30 MIN: CPT | Performed by: NURSE PRACTITIONER

## 2019-04-16 PROCEDURE — 99212 OFFICE O/P EST SF 10 MIN: CPT

## 2019-04-16 RX ORDER — FLUTICASONE PROPIONATE 50 MCG
2 SPRAY, SUSPENSION (ML) NASAL DAILY
Qty: 1 BOTTLE | Refills: 0 | Status: SHIPPED | OUTPATIENT
Start: 2019-04-16

## 2019-04-16 RX ORDER — TOBRAMYCIN 3 MG/ML
1 SOLUTION/ DROPS OPHTHALMIC EVERY 6 HOURS
Qty: 1 BOTTLE | Refills: 0 | Status: SHIPPED | OUTPATIENT
Start: 2019-04-16 | End: 2019-04-23

## 2019-04-16 RX ORDER — PREDNISONE 20 MG/1
20 TABLET ORAL 2 TIMES DAILY
Qty: 10 TABLET | Refills: 0 | Status: SHIPPED | OUTPATIENT
Start: 2019-04-16 | End: 2019-04-21

## 2019-04-16 RX ORDER — AZITHROMYCIN 250 MG/1
TABLET, FILM COATED ORAL
Qty: 6 TABLET | Refills: 0 | Status: SHIPPED | OUTPATIENT
Start: 2019-04-16 | End: 2021-04-06

## 2019-04-16 RX ORDER — BENZONATATE 200 MG/1
200 CAPSULE ORAL 3 TIMES DAILY PRN
Qty: 21 CAPSULE | Refills: 0 | Status: SHIPPED | OUTPATIENT
Start: 2019-04-16 | End: 2019-04-23

## 2019-04-16 ASSESSMENT — ENCOUNTER SYMPTOMS
NAUSEA: 0
WHEEZING: 0
FACIAL SWELLING: 0
SINUS PAIN: 0
VOICE CHANGE: 0
COUGH: 1
SINUS PRESSURE: 0
PERI-ORBITAL EDEMA: 0
DOUBLE VISION: 0
TROUBLE SWALLOWING: 0
EYE PAIN: 0
CRUSTING: 1
EYE ITCHING: 0
RHINORRHEA: 0
BLIND SPOTS: 0
PHOTOPHOBIA: 0
EYE WATERING: 1
EYE DISCHARGE: 1
SHORTNESS OF BREATH: 0
STRIDOR: 0
EYE INFLAMMATION: 0
BLURRED VISION: 0
CHEST TIGHTNESS: 0
SORE THROAT: 0
EYE REDNESS: 1

## 2019-04-16 ASSESSMENT — PAIN DESCRIPTION - FREQUENCY: FREQUENCY: CONTINUOUS

## 2019-04-16 ASSESSMENT — PAIN SCALES - GENERAL: PAINLEVEL_OUTOF10: 8

## 2019-04-16 ASSESSMENT — PAIN DESCRIPTION - ORIENTATION: ORIENTATION: RIGHT;LEFT

## 2019-04-16 ASSESSMENT — PAIN DESCRIPTION - DESCRIPTORS: DESCRIPTORS: BURNING;ITCHING

## 2019-04-16 ASSESSMENT — PAIN DESCRIPTION - LOCATION: LOCATION: EYE

## 2019-04-16 ASSESSMENT — PAIN DESCRIPTION - PAIN TYPE: TYPE: ACUTE PAIN

## 2019-04-16 NOTE — ED PROVIDER NOTES
[facility]  Urgent Care Encounter      CHIEFCOMPLAINT       Chief Complaint   Patient presents with    Conjunctivitis       Nurses Notes reviewed and I agree except as noted in the HPI. HISTORY OF PRESENT ILLNESS   Daniella Martinez is a 51 y.o. male who presents:  Patient recently has had a cold he states he has had a productive cough green sputum at times, nasal congestion left side worse than right. He denies any sore throat, ear pain, fever, chills, nausea or vomiting, myalgias. He denies any wheezing. He states he does smoke sporadically, he vapes. Past history of asthma, CAD, allergic rhinitis, diabetes, GERD, hypertension, pneumonia. The history is provided by the patient. Eye Problem   Location:  Both eyes  Quality:  Foreign body sensation, burning and tearing  Severity:  Moderate  Onset quality:  Sudden  Duration:  2 days  Timing:  Constant  Progression:  Worsening (started in right eye, now going into left eye today)  Chronicity:  New  Context: not contact lens problem and not direct trauma    Relieved by:  None tried  Worsened by:  Nothing  Ineffective treatments:  None tried  Associated symptoms: crusting (right eye), discharge (tearing both eyes), redness and tearing (tearing both eyes)    Associated symptoms: no blurred vision, no decreased vision, no double vision, no facial rash, no headaches, no inflammation, no itching, no nausea, no photophobia, no scotomas and no swelling    Risk factors: exposure to pinkeye (grandchild had pink eye last week) and recent URI (currently has URI)        REVIEW OF SYSTEMS     Review of Systems   Constitutional: Negative for activity change, appetite change, chills, diaphoresis, fatigue and fever. HENT: Positive for congestion. Negative for ear pain, facial swelling, postnasal drip, rhinorrhea, sinus pressure, sinus pain, sneezing, sore throat, trouble swallowing and voice change.     Eyes: Positive for discharge (tearing both eyes) and redness. Negative for blurred vision, double vision, photophobia, pain, itching and visual disturbance. \"gritty feeling in both eyes\", crusting right eye   Respiratory: Positive for cough. Negative for chest tightness, shortness of breath, wheezing and stridor. Gastrointestinal: Negative for nausea. Musculoskeletal: Negative for myalgias. Allergic/Immunologic: Positive for environmental allergies. Neurological: Negative for dizziness and headaches. Hematological: Negative for adenopathy. PAST MEDICAL HISTORY         Diagnosis Date    Allergic rhinitis     Anxiety     Arthritis     Asthma     CAD (coronary artery disease)     Depression     Diabetes mellitus ([facility] [address]     GERD (gastroesophageal reflux disease)     Hyperlipidemia     Hypertension     Insomnia     Ischemic cardiomyopathy 10/24/2014    Pneumonia     S/P ICD (internal cardiac defibrillator) procedure: 10/24/2014: Medtronic Single Chamber 10/24/2014    10/24/2014: Medtronic Single Chamber. Dr. Stoddard Army     Patient  has a past surgical history that includes Coronary angioplasty with stent (2002?); pacemaker placement; cyst removal (Right, 06/21/2017); Elbow surgery (Left); Cosmetic surgery (Right, 2017); and Toe amputation. CURRENT MEDICATIONS       Previous Medications    ALBUTEROL SULFATE  (90 BASE) MCG/ACT INHALER    Inhale 2 puffs into the lungs every 6 hours as needed for Wheezing    ASPIRIN EC 81 MG EC TABLET    Take 1 tablet by mouth daily    CITALOPRAM (CELEXA) 20 MG TABLET    Take 20 mg by mouth daily.     FLUTICASONE (FLONASE) 50 MCG/ACT NASAL SPRAY    2 sprays by Nasal route daily    ISOSORBIDE MONONITRATE (IMDUR) 30 MG EXTENDED RELEASE TABLET    Take 1 tablet by mouth daily    LISINOPRIL (PRINIVIL;ZESTRIL) 5 MG TABLET    Take 1 tablet by mouth daily    METFORMIN (GLUCOPHAGE) 500 MG TABLET    Take 500 mg by mouth 2 times daily (with meals)    METOPROLOL TARTRATE (LOPRESSOR) 50 MG TABLET    Take 1 tablet by mouth 2 times daily    NAPROXEN (NAPROSYN) 500 MG TABLET    Take 1 tablet by mouth 2 times daily (with meals) for 10 days    NITROGLYCERIN (NITROSTAT) 0.4 MG SL TABLET    up to max of 3 total doses. If no relief after 1 dose, call 911. OMEPRAZOLE (PRILOSEC) 40 MG DELAYED RELEASE CAPSULE    Take 1 capsule by mouth daily    RANOLAZINE (RANEXA) 1000 MG EXTENDED RELEASE TABLET    Take 1 tablet by mouth 2 times daily    TIOTROPIUM BROMIDE MONOHYDRATE (SPIRIVA HANDIHALER IN)    Inhale into the lungs every morning     TRAZODONE (DESYREL) 150 MG TABLET    Take 150 mg by mouth nightly    TRIAMTERENE-HYDROCHLOROTHIAZIDE (MAXZIDE-25) 37.5-25 MG PER TABLET    Take 0.5 tablets by mouth daily       ALLERGIES     Patient is has No Known Allergies. FAMILY HISTORY     Patient's family history includes Anemia in his daughter; COPD in his father; Cancer in his mother; Coronary Art Dis in his father and mother; Diabetes in his father and mother; Heart Attack in his father and mother; Heart Disease in his father and mother; High Blood Pressure in his father and mother; High Cholesterol in his father and mother; Prostate Cancer (age of onset: 48) in his sister; Stroke in his sister. SOCIAL HISTORY     Patient  reports that he has quit smoking. His smoking use included cigarettes. He has a 14.50 pack-year smoking history. He has never used smokeless tobacco. He reports that he drinks alcohol. He reports that he does not use drugs. PHYSICAL EXAM     ED TRIAGE VITALS  BP: 123/72, Temp: 97.3 °F (36.3 °C), Pulse: 92, Resp: 16, SpO2: 99 %  Physical Exam   Constitutional: He is oriented to person, place, and time. Vital signs are normal. He appears well-developed and well-nourished. Non-toxic appearance. He does not have a sickly appearance. He does not appear ill. No distress. HENT:   Head: Normocephalic and atraumatic.  Head is without right periorbital erythema and without left periorbital erythema. Right Ear: Hearing, tympanic membrane, external ear and ear canal normal.   Left Ear: Hearing, tympanic membrane, external ear and ear canal normal.   Nose: Mucosal edema (left nares with erythema) present. No rhinorrhea or sinus tenderness. Right sinus exhibits no maxillary sinus tenderness and no frontal sinus tenderness. Left sinus exhibits no maxillary sinus tenderness and no frontal sinus tenderness. Mouth/Throat: Uvula is midline, oropharynx is clear and moist and mucous membranes are normal. No trismus in the jaw. No uvula swelling. Eyes: Pupils are equal, round, and reactive to light. EOM are normal. Right eye exhibits no discharge. Left eye exhibits no discharge. No scleral icterus. Tearing, glossy bilateral with injected conjunctiva. Neck: Normal range of motion. Neck supple. Cardiovascular: Normal rate, regular rhythm, S1 normal, S2 normal and normal heart sounds. No murmur heard. Pulmonary/Chest: Effort normal and breath sounds normal. No respiratory distress. He has no wheezes. He has no rales. Lymphadenopathy:     He has no cervical adenopathy. Neurological: He is alert and oriented to person, place, and time. Skin: Skin is warm, dry and intact. He is not diaphoretic. No pallor. Psychiatric: He has a normal mood and affect. Nursing note and vitals reviewed. DIAGNOSTIC RESULTS   Labs:No results found for this visit on 04/16/19. IMAGING:    URGENT CARE COURSE:     Vitals:    04/16/19 1424   BP: 123/72   Pulse: 92   Resp: 16   Temp: 97.3 °F (36.3 °C)   TempSrc: Temporal   SpO2: 99%   Weight: 208 lb (94.3 kg)       Medications - No data to display  PROCEDURES:  None  FINAL IMPRESSION       1. Acute bacterial conjunctivitis of both eyes    2. Viral URI    3.  History of asthma        DISPOSITION/PLAN   DISPOSITION Decision To Discharge 04/16/2019 02:35:41 PM  Wash hands good  Wipe eyes from nose to ear  Monitor for any increase in redness, pain or

## 2019-04-16 NOTE — ED TRIAGE NOTES
Pt ambulatory into esuc with c/o bilateral eye drainage, redness with itch and burning for the past day. 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 Pt states he was around his granddaughter that had pink eye. Pt denies any visual changes.

## 2019-04-16 NOTE — ED NOTES
Pt verbalized discharge instructions. Pt informed to go to ER if develop chest pain, shortness of breath or abdominal pain. Pt ambulatory out in stable condition. Assessment unchanged.        Karlos Solano RN  04/16/19 1288

## 2019-06-11 ENCOUNTER — TELEPHONE (OUTPATIENT)
Dept: CARDIOLOGY CLINIC | Age: 49
End: 2019-06-11

## 2019-06-11 NOTE — LETTER
[facility] [address]  [place] [address]  Phone: [phone]  Fax: [phone]      June 11, 2019    [address] [facility] [address] [facility]      Dear Janeece Nyhan: Sara January Sara January Our office did not receive your [employer] transmission which was scheduled for June 6, 2019    Our website indicates your monitor is disconnected. Please check the connections and send a manual download ASAP. If the monitor appears to be connected and did not send, unplug it for three seconds, plug it back in and try to resend. If you need help sending a download, please call [employer] at [phone]. Our office is not responsible for missed transmissions. If you have any questions or concerns, please don't hesitate to call.     Sincerely,        [facility]

## 2019-07-16 ENCOUNTER — TELEPHONE (OUTPATIENT)
Dept: CARDIOLOGY CLINIC | Age: 49
End: 2019-07-16

## 2019-12-13 ENCOUNTER — TELEPHONE (OUTPATIENT)
Dept: CARDIOLOGY CLINIC | Age: 49
End: 2019-12-13

## 2019-12-13 ENCOUNTER — NURSE ONLY (OUTPATIENT)
Dept: CARDIOLOGY CLINIC | Age: 49
End: 2019-12-13
Payer: MEDICAID

## 2019-12-13 ENCOUNTER — OFFICE VISIT (OUTPATIENT)
Dept: CARDIOLOGY CLINIC | Age: 49
End: 2019-12-13
Payer: MEDICAID

## 2019-12-13 VITALS
HEART RATE: 113 BPM | HEIGHT: 69 IN | SYSTOLIC BLOOD PRESSURE: 138 MMHG | DIASTOLIC BLOOD PRESSURE: 76 MMHG | WEIGHT: 214 LBS | BODY MASS INDEX: 31.7 KG/M2

## 2019-12-13 DIAGNOSIS — Z72.0 NICOTINE ABUSE: ICD-10-CM

## 2019-12-13 DIAGNOSIS — R06.02 SOB (SHORTNESS OF BREATH) ON EXERTION: ICD-10-CM

## 2019-12-13 DIAGNOSIS — R07.9 CHEST PAIN IN ADULT: Primary | ICD-10-CM

## 2019-12-13 DIAGNOSIS — Z98.890 S/P CARDIAC CATH: ICD-10-CM

## 2019-12-13 DIAGNOSIS — E78.5 DYSLIPIDEMIA: ICD-10-CM

## 2019-12-13 DIAGNOSIS — Z91.199 NONCOMPLIANCE: ICD-10-CM

## 2019-12-13 DIAGNOSIS — I25.10 CORONARY ARTERY DISEASE INVOLVING NATIVE CORONARY ARTERY OF NATIVE HEART, ANGINA PRESENCE UNSPECIFIED: ICD-10-CM

## 2019-12-13 DIAGNOSIS — I25.5 ISCHEMIC CARDIOMYOPATHY: ICD-10-CM

## 2019-12-13 DIAGNOSIS — Z95.810 S/P ICD (INTERNAL CARDIAC DEFIBRILLATOR) PROCEDURE: ICD-10-CM

## 2019-12-13 DIAGNOSIS — Z95.810 S/P ICD (INTERNAL CARDIAC DEFIBRILLATOR) PROCEDURE: Primary | ICD-10-CM

## 2019-12-13 PROCEDURE — 99215 OFFICE O/P EST HI 40 MIN: CPT | Performed by: INTERNAL MEDICINE

## 2019-12-13 PROCEDURE — G8427 DOCREV CUR MEDS BY ELIG CLIN: HCPCS | Performed by: INTERNAL MEDICINE

## 2019-12-13 PROCEDURE — G8417 CALC BMI ABV UP PARAM F/U: HCPCS | Performed by: INTERNAL MEDICINE

## 2019-12-13 PROCEDURE — 3017F COLORECTAL CA SCREEN DOC REV: CPT | Performed by: INTERNAL MEDICINE

## 2019-12-13 PROCEDURE — 1036F TOBACCO NON-USER: CPT | Performed by: INTERNAL MEDICINE

## 2019-12-13 PROCEDURE — 93282 PRGRMG EVAL IMPLANTABLE DFB: CPT | Performed by: INTERNAL MEDICINE

## 2019-12-13 PROCEDURE — G8484 FLU IMMUNIZE NO ADMIN: HCPCS | Performed by: INTERNAL MEDICINE

## 2019-12-13 PROCEDURE — 93000 ELECTROCARDIOGRAM COMPLETE: CPT | Performed by: INTERNAL MEDICINE

## 2019-12-13 PROCEDURE — G8599 NO ASA/ANTIPLAT THER USE RNG: HCPCS | Performed by: INTERNAL MEDICINE

## 2019-12-13 RX ORDER — ISOSORBIDE MONONITRATE 30 MG/1
30 TABLET, EXTENDED RELEASE ORAL DAILY
Qty: 30 TABLET | Refills: 3 | Status: SHIPPED | OUTPATIENT
Start: 2019-12-13 | End: 2020-03-09

## 2019-12-13 RX ORDER — METOPROLOL TARTRATE 50 MG/1
50 TABLET, FILM COATED ORAL 2 TIMES DAILY
Qty: 180 TABLET | Refills: 3 | Status: SHIPPED | OUTPATIENT
Start: 2019-12-13 | End: 2020-01-28

## 2019-12-13 RX ORDER — METOPROLOL TARTRATE 50 MG/1
50 TABLET, FILM COATED ORAL 2 TIMES DAILY
Qty: 180 TABLET | Refills: 3 | Status: CANCELLED | OUTPATIENT
Start: 2019-12-13

## 2019-12-13 RX ORDER — ISOSORBIDE MONONITRATE 30 MG/1
30 TABLET, EXTENDED RELEASE ORAL DAILY
Qty: 30 TABLET | Refills: 3 | Status: CANCELLED | OUTPATIENT
Start: 2019-12-13

## 2019-12-13 RX ORDER — RANOLAZINE 1000 MG/1
1000 TABLET, EXTENDED RELEASE ORAL 2 TIMES DAILY
Qty: 90 TABLET | Refills: 1 | Status: CANCELLED | OUTPATIENT
Start: 2019-12-13

## 2019-12-13 RX ORDER — RANOLAZINE 1000 MG/1
1000 TABLET, EXTENDED RELEASE ORAL 2 TIMES DAILY
Qty: 90 TABLET | Refills: 1 | Status: SHIPPED | OUTPATIENT
Start: 2019-12-13

## 2019-12-22 ENCOUNTER — HOSPITAL ENCOUNTER (EMERGENCY)
Age: 49
Discharge: HOME | End: 2019-12-22
Payer: MEDICAID

## 2019-12-22 ENCOUNTER — APPOINTMENT (OUTPATIENT)
Dept: GENERAL RADIOLOGY | Age: 49
End: 2019-12-22
Payer: MEDICAID

## 2019-12-22 VITALS
OXYGEN SATURATION: 97 % | RESPIRATION RATE: 14 BRPM | WEIGHT: 215 LBS | HEIGHT: 70 IN | TEMPERATURE: 98.3 F | SYSTOLIC BLOOD PRESSURE: 135 MMHG | DIASTOLIC BLOOD PRESSURE: 84 MMHG | BODY MASS INDEX: 30.78 KG/M2 | HEART RATE: 105 BPM

## 2019-12-22 DIAGNOSIS — M25.562 LEFT KNEE PAIN, UNSPECIFIED CHRONICITY: Primary | ICD-10-CM

## 2019-12-22 PROCEDURE — 73564 X-RAY EXAM KNEE 4 OR MORE: CPT

## 2019-12-22 PROCEDURE — 99283 EMERGENCY DEPT VISIT LOW MDM: CPT

## 2019-12-22 RX ORDER — IBUPROFEN 600 MG/1
600 TABLET ORAL 3 TIMES DAILY PRN
Qty: 30 TABLET | Refills: 0 | Status: SHIPPED | OUTPATIENT
Start: 2019-12-22 | End: 2020-01-30

## 2019-12-22 ASSESSMENT — PAIN DESCRIPTION - LOCATION: LOCATION: KNEE

## 2019-12-22 ASSESSMENT — ENCOUNTER SYMPTOMS
ABDOMINAL PAIN: 0
BACK PAIN: 0
VOMITING: 0
NAUSEA: 0
SHORTNESS OF BREATH: 0

## 2019-12-22 ASSESSMENT — PAIN DESCRIPTION - PAIN TYPE: TYPE: ACUTE PAIN

## 2019-12-22 ASSESSMENT — PAIN SCALES - GENERAL: PAINLEVEL_OUTOF10: 10

## 2019-12-22 ASSESSMENT — PAIN DESCRIPTION - DESCRIPTORS: DESCRIPTORS: THROBBING

## 2019-12-22 ASSESSMENT — PAIN DESCRIPTION - FREQUENCY: FREQUENCY: CONTINUOUS

## 2020-01-03 ENCOUNTER — APPOINTMENT (OUTPATIENT)
Dept: GENERAL RADIOLOGY | Age: 50
End: 2020-01-03
Payer: MEDICAID

## 2020-01-03 ENCOUNTER — HOSPITAL ENCOUNTER (EMERGENCY)
Age: 50
Discharge: HOME | End: 2020-01-03
Payer: MEDICAID

## 2020-01-03 VITALS
HEART RATE: 94 BPM | OXYGEN SATURATION: 98 % | RESPIRATION RATE: 18 BRPM | SYSTOLIC BLOOD PRESSURE: 150 MMHG | TEMPERATURE: 98.1 F | DIASTOLIC BLOOD PRESSURE: 90 MMHG

## 2020-01-03 PROCEDURE — 6370000000 HC RX 637 (ALT 250 FOR IP): Performed by: PHYSICIAN ASSISTANT

## 2020-01-03 PROCEDURE — 99283 EMERGENCY DEPT VISIT LOW MDM: CPT

## 2020-01-03 PROCEDURE — 96372 THER/PROPH/DIAG INJ SC/IM: CPT

## 2020-01-03 PROCEDURE — 73030 X-RAY EXAM OF SHOULDER: CPT

## 2020-01-03 PROCEDURE — 6360000002 HC RX W HCPCS: Performed by: PHYSICIAN ASSISTANT

## 2020-01-03 PROCEDURE — 72040 X-RAY EXAM NECK SPINE 2-3 VW: CPT

## 2020-01-03 RX ORDER — METHYLPREDNISOLONE SODIUM SUCCINATE 125 MG/2ML
80 INJECTION, POWDER, LYOPHILIZED, FOR SOLUTION INTRAMUSCULAR; INTRAVENOUS ONCE
Status: COMPLETED | OUTPATIENT
Start: 2020-01-03 | End: 2020-01-03

## 2020-01-03 RX ORDER — LIDOCAINE 4 G/G
1 PATCH TOPICAL DAILY
Status: DISCONTINUED | OUTPATIENT
Start: 2020-01-04 | End: 2020-01-04 | Stop reason: HOSPADM

## 2020-01-03 RX ORDER — ORPHENADRINE CITRATE 30 MG/ML
60 INJECTION INTRAMUSCULAR; INTRAVENOUS ONCE
Status: COMPLETED | OUTPATIENT
Start: 2020-01-03 | End: 2020-01-03

## 2020-01-03 RX ORDER — CYCLOBENZAPRINE HCL 10 MG
10 TABLET ORAL 3 TIMES DAILY PRN
Qty: 21 TABLET | Refills: 0 | Status: SHIPPED | OUTPATIENT
Start: 2020-01-03 | End: 2020-01-10

## 2020-01-03 RX ORDER — LIDOCAINE 50 MG/G
1 PATCH TOPICAL DAILY
Qty: 10 PATCH | Refills: 0 | Status: SHIPPED | OUTPATIENT
Start: 2020-01-03 | End: 2020-01-13

## 2020-01-03 RX ORDER — ACETAMINOPHEN 500 MG
500 TABLET ORAL EVERY 6 HOURS PRN
Qty: 120 TABLET | Refills: 0 | Status: SHIPPED | OUTPATIENT
Start: 2020-01-03 | End: 2021-04-06

## 2020-01-03 RX ORDER — METHYLPREDNISOLONE 4 MG/1
TABLET ORAL
Qty: 1 KIT | Refills: 0 | Status: SHIPPED | OUTPATIENT
Start: 2020-01-03 | End: 2020-01-09

## 2020-01-03 RX ORDER — TRAMADOL HYDROCHLORIDE 50 MG/1
50 TABLET ORAL ONCE
Status: COMPLETED | OUTPATIENT
Start: 2020-01-03 | End: 2020-01-03

## 2020-01-03 RX ADMIN — ORPHENADRINE CITRATE 60 MG: 30 INJECTION INTRAMUSCULAR; INTRAVENOUS at 21:48

## 2020-01-03 RX ADMIN — TRAMADOL HYDROCHLORIDE 50 MG: 50 TABLET, FILM COATED ORAL at 21:48

## 2020-01-03 RX ADMIN — METHYLPREDNISOLONE SODIUM SUCCINATE 80 MG: 125 INJECTION, POWDER, FOR SOLUTION INTRAMUSCULAR; INTRAVENOUS at 21:48

## 2020-01-03 ASSESSMENT — ENCOUNTER SYMPTOMS
SHORTNESS OF BREATH: 0
COUGH: 0
ABDOMINAL PAIN: 0
BACK PAIN: 0
WHEEZING: 0
DIARRHEA: 0
COLOR CHANGE: 0
NAUSEA: 0
VOMITING: 0
CONSTIPATION: 0

## 2020-01-03 ASSESSMENT — PAIN SCALES - GENERAL
PAINLEVEL_OUTOF10: 10
PAINLEVEL_OUTOF10: 10

## 2020-01-04 ASSESSMENT — ENCOUNTER SYMPTOMS: PHOTOPHOBIA: 0

## 2020-01-04 NOTE — ED PROVIDER NOTES
pain, gait problem, joint swelling, myalgias and neck stiffness. Skin: Negative for color change and pallor. Neurological: Negative for dizziness, syncope, weakness, light-headedness, numbness and headaches. Hematological: Negative for adenopathy. PAST MEDICAL HISTORY     Past Medical History:   Diagnosis Date    Allergic rhinitis     Anxiety     Arthritis     Asthma     CAD (coronary artery disease)     Depression     Diabetes mellitus ([facility]     GERD (gastroesophageal reflux disease)     Hyperlipidemia     Hypertension     Insomnia     Ischemic cardiomyopathy 10/24/2014    Pneumonia     S/P ICD (internal cardiac defibrillator) procedure: 10/24/2014: Medtronic Single Chamber 10/24/2014    10/24/2014: Medtronic Single Chamber. Dr. José Luis Porter      has a past surgical history that includes Coronary angioplasty with stent (2002?); pacemaker placement; cyst removal (Right, 06/21/2017); Elbow surgery (Left); Cosmetic surgery (Right, 2017); and Toe amputation.     CURRENT MEDICATIONS       Discharge Medication List as of 1/3/2020 11:06 PM      CONTINUE these medications which have NOT CHANGED    Details   ibuprofen (ADVIL;MOTRIN) 600 MG tablet Take 1 tablet by mouth 3 times daily as needed for Pain, Disp-30 tablet, R-0Print      isosorbide mononitrate (IMDUR) 30 MG extended release tablet Take 1 tablet by mouth daily, Disp-30 tablet, R-3Normal      ranolazine (RANEXA) 1000 MG extended release tablet Take 1 tablet by mouth 2 times daily, Disp-90 tablet, R-1Normal      metoprolol tartrate (LOPRESSOR) 50 MG tablet Take 1 tablet by mouth 2 times daily, Disp-180 tablet, R-3Normal      azithromycin (ZITHROMAX Z-CINTHIA) 250 MG tablet 2 tablets day 1 then1 tablet days 2 - 5., Disp-6 tablet, R-0Normal      !! fluticasone (FLONASE) 50 MCG/ACT nasal spray 2 sprays by Nasal route daily Apply daily to each nare, Disp-1 Bottle, R-0Normal      nitroGLYCERIN (NITROSTAT) 0.4 MG SL tablet up to max normal.      Left Ear: External ear normal.      Nose: Nose normal.      Mouth/Throat:      Mouth: Mucous membranes are moist.      Pharynx: Oropharynx is clear. Eyes:      General: No scleral icterus. Right eye: No discharge. Left eye: No discharge. Conjunctiva/sclera: Conjunctivae normal.      Pupils: Pupils are equal, round, and reactive to light. Neck:      Musculoskeletal: Normal range of motion. Cardiovascular:      Rate and Rhythm: Normal rate and regular rhythm. Pulses: Normal pulses. Radial pulses are 2+ on the right side. Heart sounds: Normal heart sounds. No murmur. No friction rub. No gallop. Comments: Capillary refill is less than 3 seconds. Pulmonary:      Effort: Pulmonary effort is normal. No respiratory distress. Breath sounds: Normal breath sounds. No stridor. No wheezing, rhonchi or rales. Chest:      Chest wall: No tenderness. Musculoskeletal:         General: Tenderness present. No swelling or deformity. Right shoulder: He exhibits decreased range of motion and pain. He exhibits no tenderness, no swelling, no effusion, no crepitus, no deformity, no spasm and normal strength. Right elbow: Normal.He exhibits normal range of motion, no swelling and no deformity. No tenderness found. Right wrist: Normal. He exhibits normal range of motion, no tenderness, no swelling, no effusion, no crepitus and no deformity. Cervical back: He exhibits tenderness and pain. He exhibits normal range of motion, no swelling, no deformity and no spasm. Thoracic back: Normal. He exhibits normal range of motion, no tenderness, no swelling, no deformity, no pain and no spasm. Lumbar back: Normal. He exhibits normal range of motion, no tenderness, no swelling, no deformity, no pain and no spasm. Right upper arm: He exhibits tenderness. He exhibits no swelling and no deformity.       Right forearm: Normal. He exhibits no

## 2020-01-04 NOTE — ED TRIAGE NOTES
Pt comes in tonight with complaint of right shoulder and right sided neck pain. This pain has been going on the past 2-3 days with no known injury. He has limited range of motion due to pain. He came in last night and then left because it was \"to busy\" he states.

## 2020-01-07 ENCOUNTER — TELEPHONE (OUTPATIENT)
Dept: CARDIOLOGY CLINIC | Age: 50
End: 2020-01-07

## 2020-01-14 ENCOUNTER — TELEPHONE (OUTPATIENT)
Dept: CARDIOLOGY CLINIC | Age: 50
End: 2020-01-14

## 2020-01-21 ENCOUNTER — HOSPITAL ENCOUNTER (OUTPATIENT)
Dept: NON INVASIVE DIAGNOSTICS | Age: 50
Discharge: HOME | End: 2020-01-21
Payer: MEDICAID

## 2020-01-21 LAB
LV EF: 38 %
LVEF MODALITY: NORMAL

## 2020-01-21 PROCEDURE — 78452 HT MUSCLE IMAGE SPECT MULT: CPT

## 2020-01-21 PROCEDURE — 3430000000 HC RX DIAGNOSTIC RADIOPHARMACEUTICAL: Performed by: INTERNAL MEDICINE

## 2020-01-21 PROCEDURE — A9500 TC99M SESTAMIBI: HCPCS | Performed by: INTERNAL MEDICINE

## 2020-01-21 PROCEDURE — 6360000002 HC RX W HCPCS

## 2020-01-21 PROCEDURE — 93017 CV STRESS TEST TRACING ONLY: CPT | Performed by: INTERNAL MEDICINE

## 2020-01-21 PROCEDURE — 2709999900 HC NON-CHARGEABLE SUPPLY

## 2020-01-21 PROCEDURE — 93306 TTE W/DOPPLER COMPLETE: CPT

## 2020-01-21 RX ADMIN — Medication 33.6 MILLICURIE: at 08:45

## 2020-01-21 RX ADMIN — Medication 9.9 MILLICURIE: at 08:00

## 2020-01-28 ENCOUNTER — OFFICE VISIT (OUTPATIENT)
Dept: CARDIOLOGY CLINIC | Age: 50
End: 2020-01-28

## 2020-01-28 VITALS
SYSTOLIC BLOOD PRESSURE: 133 MMHG | HEIGHT: 71 IN | BODY MASS INDEX: 29.96 KG/M2 | DIASTOLIC BLOOD PRESSURE: 85 MMHG | HEART RATE: 104 BPM | WEIGHT: 214 LBS

## 2020-01-28 PROBLEM — R07.9 CHEST PAIN: Status: RESOLVED | Noted: 2018-01-13 | Resolved: 2020-01-28

## 2020-01-28 PROCEDURE — 99214 OFFICE O/P EST MOD 30 MIN: CPT | Performed by: INTERNAL MEDICINE

## 2020-01-28 RX ORDER — METOPROLOL TARTRATE 50 MG/1
75 TABLET, FILM COATED ORAL 2 TIMES DAILY
Qty: 90 TABLET | Refills: 3 | Status: SHIPPED | OUTPATIENT
Start: 2020-01-28

## 2020-01-28 RX ORDER — METOPROLOL TARTRATE 37.5 MG/1
75 TABLET, FILM COATED ORAL 2 TIMES DAILY
Qty: 120 TABLET | Refills: 5 | Status: SHIPPED | OUTPATIENT
Start: 2020-01-28 | End: 2020-01-28

## 2020-01-28 RX ORDER — PRAVASTATIN SODIUM 40 MG
40 TABLET ORAL DAILY
Qty: 30 TABLET | Refills: 5 | Status: SHIPPED | OUTPATIENT
Start: 2020-01-28

## 2020-01-28 NOTE — LETTER
[facility] [facility] Cardiology  [facility] [address] [place] [address]  Phone: [phone]  Fax: [phone]    Rory Granado MD        January 28, 2020     Patient: Lucero Rouse   YOB: 1970   Date of Visit: 1/28/2020       To Whom it May Concern: Luciana Hinds was seen in my clinic on 1/28/2020. If you have any questions or concerns, please don't hesitate to call.     Sincerely,         oRry Granado MD

## 2020-01-28 NOTE — PROGRESS NOTES
Chief Complaint   Patient presents with    Follow-up     Stress and echo   former pat of   This patient has ischemic cardiomyopathy and AICD implant was done last October. He continues to smoke heavily  Had hx of cath and stent  And later ICD  Had cardiac cath 2012 for stemi inferior and had stent in the RCA at [facility]  Other coronary has mild disease and EF 35%- per report from [address] [place] [address]. EKG DONE 12-13-19. Denied chest pain after med started    Denied palpitation , dizziness, or edema    Hx of  of chronic intermittent chest pain-for 6 to 7 years    Sob on exertion - better after lost wt    No orthopnea    NO evidence of leg edema or chf    Patient here for pre-op clearance for tooth extraction with [address] [place] [address], not yet scheduled.       Patient Active Problem List   Diagnosis    CAD (coronary artery disease), S/P stent to RCA=- 2012    Type 2 diabetes mellitus with hyperglycemia, without long-term current use of insulin ([facility] [address]    Dyslipidemia    Ischemic cardiomyopathy EF 35%- later EF 50%- EF now 40%    S/P ICD (internal cardiac defibrillator) procedure: 10/24/2014: Medtronic Single Chamber    S/P cardiac cath 03/2017    Stable angina (HCC)    Anterior chest wall  at site of AICD    Chronic chest pain due to ???    Nicotine abuse    History of asthma    Hx of gastroesophageal reflux (GERD)    Marijuana use    SOB (shortness of breath) on exertion    Noncompliance       Past Surgical History:   Procedure Laterality Date    CORONARY ANGIOPLASTY WITH STENT PLACEMENT  2002?    x2 @ [address] Right 2017    R eye cyst removal    CYST REMOVAL Right 06/21/2017    eye    ELBOW SURGERY Left     PACEMAKER PLACEMENT      TOE AMPUTATION         No Known Allergies     Family History   Problem Relation Age of Onset    Heart Disease Mother     Diabetes Mother     Cancer Mother     Coronary Art Dis Mother     Heart Attack Mother    Montserrat Howell Lymphatic ROS: No history of blood clots or bleeding disorder. Respiratory ROS: no cough, shortness of breath, or wheezing  Cardiovascular ROS: no chest pain or dyspnea on exertion  Gastrointestinal ROS: negative  Genito-Urinary ROS: no dysuria, trouble voiding, or hematuria  Musculoskeletal ROS: negative  Neurological ROS: no TIA or stroke symptoms  Dermatological ROS: negative      Blood pressure 133/85, pulse 104, height 5' 11\" (1.803 m), weight 214 lb (97.1 kg). Physical Examination:    General appearance - alert, well appearing, and in no distress  Mental status - alert, oriented to person, place, and time  Neck - supple, no significant adenopathy, no JVD, or carotid bruits  Chest - clear to auscultation, no wheezes, rales or rhonchi, symmetric air entry  Heart - normal rate, regular rhythm, normal S1, S2, no murmurs, rubs, clicks or gallops  Abdomen - soft, nontender, nondistended, no masses or organomegaly  Neurological - alert, oriented, normal speech, no focal findings or movement disorder noted  Musculoskeletal - no joint tenderness, deformity or swelling  Extremities - peripheral pulses normal, no pedal edema, no clubbing or cyanosis  Skin - normal coloration and turgor, no rashes, no suspicious skin lesions noted    Lab  No results for input(s): CKTOTAL, CKMB, CKMBINDEX, TROPONINI in the last 72 hours.   CBC:   Lab Results   Component Value Date    WBC 12.1 08/19/2018    RBC 5.33 08/19/2018    HGB 16.6 08/19/2018    HCT 46.8 08/19/2018    MCV 87.8 08/19/2018    MCH 31.1 08/19/2018    MCHC 35.5 08/19/2018    RDW 16.0 01/12/2018     08/19/2018    MPV 10.5 08/19/2018     BMP:    Lab Results   Component Value Date     08/19/2018    K 3.8 08/19/2018    K 4.2 01/12/2018     08/19/2018    CO2 26 08/19/2018    BUN 14 08/19/2018    LABALBU 4.7 08/19/2018    CREATININE 1.1 08/19/2018    CALCIUM 10.1 08/19/2018    LABGLOM 86 08/19/2018    GLUCOSE 161 08/19/2018     Hepatic Function Panel: Lab Results   Component Value Date    ALKPHOS 78 08/19/2018    ALT 16 08/19/2018    AST 16 08/19/2018    PROT 7.9 08/19/2018    BILITOT 0.2 08/19/2018    BILIDIR <0.2 08/19/2018    LABALBU 4.7 08/19/2018     Magnesium:    Lab Results   Component Value Date    MG 1.6 08/19/2018     Warfarin PT/INR:  No components found for: PTPATWAR, PTINRWAR  HgBA1c:    Lab Results   Component Value Date    LABA1C 7.1 08/19/2018     FLP:    Lab Results   Component Value Date    TRIG 191 10/22/2014    HDL 42 10/22/2014    LDLCALC 111 10/22/2014     TSH:    Lab Results   Component Value Date    TSH 1.450 08/20/2018       EKG 1/10/17  Sinus  Rhythm   Low voltage with rightward P-axis and rotation -possible pulmonary disease. ABNORMAL     Conclusions      Summary   Lexiscan EKG stress test is not suggestive for ischemia.   Calculated gated LVEF 42 %.   The T.I.D. ratio was 1.06 .   There was a small to moderate sized, moderately severe, fixed myocardial   perfusion defect of the apical and inferior wall . This is suggestive for   old trans-mural infarct versus attenuation artifact at these sites.  [employer] was no evidence of definite reversible tracer uptake.   The nuclear images is not suggestive for myocardial ischemia.      Signatures      ----------------------------------------------------------------   Electronically signed by Ashvin Toro MD (Interpreting   Cardiologist) on 01/20/2017 at 19:52      Procedure Summary  LM-P, LAD MID 20% DISTAL 30% MYOCARDIAL BRIDGING, LCX OSTIAL 40% MID 40%, RAMUS PROX 30% ,  RCA MID 20% , DISTAL RCA HAS STENT AND THERE 55 TO 60% ISR, NAHEED III FLOW  Severely reduced LV systolic function. Severe global hypokinesis observed. LVEF approximately 15% . LV size - severely dialated. EDP 12 mmhg  IMAGE REVIEWED WITH dR. Dasha Salgado 150  Recommendations  Continue with aggressive risk factor modification and medical therapy.   CONSIDER CUTTING POBA OF THE DISTAL RCA ISR IF REMAIN SYMPTOMATIC DESPITE MED RX CUTTING POBA OF THE DISTAL RCA ISR- if ANY RECURRENCE OF CP  AFTER Restart the meds PAT GOT CHEST PAIN FREE  Echo EF 35 to 40%  lexisc nuc- negative         Congestive heart failure: no evidence of fluid overload today, no recent hospitalization for CHF  Has been off  maxizide 25/25 -1/2 tab po qd for over 3 months and no leg edema and noc chf       Coronary artery disease, seems to be stable. Denies angina or change in breathing pattern  Coronary artery disease- hx of stent   Chest pain resolved after Resumed cardiac meds   Cont asa. lopressor , imdur 30 po qd, ranexa 500 bid, pravastatin 40 po qd and  Lisinopril 2.5 mg po qd  nuc stress negative  Increase lopressor  75 po bid from 50 bid for sinus tachy and Hx of ICD shock 2018      GERD  Cont protonix 40 po qd    Cardiomyopathy: improving, no CHF symptoms, no change in clinical condition. Will need periodic echocardiograms depending on symptoms. Echo EF 35 to 40%     Hyperlipidemia: on statins, followed periodically. Patient need periodic lipid and liver profile. ICD  interrogation  carolyn dec 2019  Dr. Kaila Romano to see him for Failed ICD shock  Dec 2018- sse eps for possible DFT testing  Was not taking meds then    Non -complaince advised for F/u and meds advised    D/w the pat at length    Discussed use, benefit, and side effects of prescribed medications. All patient questions answered. Pt voiced understanding. Instructed to continue current medications, diet and exercise. Continue risk factor modification and medical management. Patient agreed with treatment plan. Follow up as directed.     Lipid panel and liver function test before next appointment       I spent 25 minutes involved in face-to-face discussion of medical issues, prognosis, record review  and plan with the patient today and more than 50% of the time was spent on counseling and coordination of care      RTC in 4 month  [address] [place] [address]

## 2020-01-29 ENCOUNTER — TELEPHONE (OUTPATIENT)
Dept: CARDIOLOGY CLINIC | Age: 50
End: 2020-01-29

## 2020-01-29 NOTE — LETTER
[address] [facility] Cardiology  [facility] [address] [place] [address]  Phone: [phone]  Fax: [phone]    Sole Hill MD        January 29, 2020     Patient: Obinna Soto   YOB: 1970   Date of Visit: 1/29/2020       To Whom It May Concern: It is my medical opinion that [place] may lift/exercise as tolerated with no definite restriction. If you have any questions or concerns, please don't hesitate to call.     Sincerely,        Sole Hill MD

## 2020-01-29 NOTE — TELEPHONE ENCOUNTER
Received fax from [facility]. Does pt have any restrictions to strenuous activity or any restrictions to lifting or any other restrictions. Please advise.

## 2020-01-30 ENCOUNTER — HOSPITAL ENCOUNTER (EMERGENCY)
Age: 50
Discharge: HOME | End: 2020-01-30
Payer: MEDICAID

## 2020-01-30 VITALS
DIASTOLIC BLOOD PRESSURE: 83 MMHG | TEMPERATURE: 97.8 F | SYSTOLIC BLOOD PRESSURE: 152 MMHG | OXYGEN SATURATION: 100 % | BODY MASS INDEX: 29.85 KG/M2 | HEART RATE: 90 BPM | WEIGHT: 214 LBS | RESPIRATION RATE: 18 BRPM

## 2020-01-30 PROCEDURE — 99282 EMERGENCY DEPT VISIT SF MDM: CPT

## 2020-01-30 PROCEDURE — 96374 THER/PROPH/DIAG INJ IV PUSH: CPT

## 2020-01-30 PROCEDURE — 96372 THER/PROPH/DIAG INJ SC/IM: CPT

## 2020-01-30 PROCEDURE — 6370000000 HC RX 637 (ALT 250 FOR IP): Performed by: PHYSICIAN ASSISTANT

## 2020-01-30 PROCEDURE — 6360000002 HC RX W HCPCS: Performed by: PHYSICIAN ASSISTANT

## 2020-01-30 PROCEDURE — 2709999900 HC NON-CHARGEABLE SUPPLY

## 2020-01-30 RX ORDER — METHYLPREDNISOLONE 4 MG/1
TABLET ORAL
Qty: 1 KIT | Refills: 0 | Status: SHIPPED | OUTPATIENT
Start: 2020-01-30 | End: 2020-02-05

## 2020-01-30 RX ORDER — FAMOTIDINE 20 MG/1
20 TABLET, FILM COATED ORAL 2 TIMES DAILY
Qty: 60 TABLET | Refills: 0 | Status: SHIPPED | OUTPATIENT
Start: 2020-01-30 | End: 2021-04-06

## 2020-01-30 RX ORDER — DIPHENHYDRAMINE HCL 25 MG
25 CAPSULE ORAL EVERY 4 HOURS PRN
Qty: 20 CAPSULE | Refills: 0 | Status: SHIPPED | OUTPATIENT
Start: 2020-01-30 | End: 2020-02-09

## 2020-01-30 RX ORDER — KETOROLAC TROMETHAMINE 30 MG/ML
30 INJECTION, SOLUTION INTRAMUSCULAR; INTRAVENOUS ONCE
Status: COMPLETED | OUTPATIENT
Start: 2020-01-30 | End: 2020-01-30

## 2020-01-30 RX ORDER — IBUPROFEN 600 MG/1
600 TABLET ORAL EVERY 6 HOURS PRN
Qty: 30 TABLET | Refills: 0 | Status: SHIPPED | OUTPATIENT
Start: 2020-01-30

## 2020-01-30 RX ORDER — FAMOTIDINE 20 MG/1
20 TABLET, FILM COATED ORAL ONCE
Status: COMPLETED | OUTPATIENT
Start: 2020-01-30 | End: 2020-01-30

## 2020-01-30 RX ORDER — METHYLPREDNISOLONE SODIUM SUCCINATE 125 MG/2ML
80 INJECTION, POWDER, LYOPHILIZED, FOR SOLUTION INTRAMUSCULAR; INTRAVENOUS ONCE
Status: COMPLETED | OUTPATIENT
Start: 2020-01-30 | End: 2020-01-30

## 2020-01-30 RX ORDER — DIPHENHYDRAMINE HCL 25 MG
25 TABLET ORAL ONCE
Status: COMPLETED | OUTPATIENT
Start: 2020-01-30 | End: 2020-01-30

## 2020-01-30 RX ADMIN — FAMOTIDINE 20 MG: 20 TABLET ORAL at 13:39

## 2020-01-30 RX ADMIN — DIPHENHYDRAMINE HCL 25 MG: 25 TABLET ORAL at 13:39

## 2020-01-30 RX ADMIN — METHYLPREDNISOLONE SODIUM SUCCINATE 80 MG: 125 INJECTION, POWDER, FOR SOLUTION INTRAMUSCULAR; INTRAVENOUS at 13:39

## 2020-01-30 RX ADMIN — KETOROLAC TROMETHAMINE 30 MG: 30 INJECTION, SOLUTION INTRAMUSCULAR at 13:39

## 2020-01-30 ASSESSMENT — PAIN SCALES - GENERAL: PAINLEVEL_OUTOF10: 10

## 2020-01-30 ASSESSMENT — ENCOUNTER SYMPTOMS
SHORTNESS OF BREATH: 0
NAUSEA: 0
PHOTOPHOBIA: 0
EYE REDNESS: 0
DIARRHEA: 0
ABDOMINAL PAIN: 0
EYE ITCHING: 0
WHEEZING: 0
BACK PAIN: 0
VOMITING: 0
CONSTIPATION: 0
COUGH: 0
EYE PAIN: 0

## 2020-01-30 ASSESSMENT — PAIN DESCRIPTION - LOCATION: LOCATION: ARM

## 2020-01-30 ASSESSMENT — PAIN DESCRIPTION - DESCRIPTORS: DESCRIPTORS: BURNING

## 2020-01-30 ASSESSMENT — PAIN DESCRIPTION - ORIENTATION: ORIENTATION: LEFT;RIGHT

## 2020-01-30 NOTE — ED PROVIDER NOTES
 Arthritis     Asthma     CAD (coronary artery disease)     Depression     Diabetes mellitus ([facility] [address]     GERD (gastroesophageal reflux disease)     Hyperlipidemia     Hypertension     Insomnia     Ischemic cardiomyopathy 10/24/2014    Pneumonia     S/P ICD (internal cardiac defibrillator) procedure: 10/24/2014: Medtronic Single Chamber 10/24/2014    10/24/2014: Medtronic Single Chamber. Dr. Kamille Zhong      has a past surgical history that includes Coronary angioplasty with stent (2002?); pacemaker placement; cyst removal (Right, 06/21/2017); Elbow surgery (Left); Cosmetic surgery (Right, 2017); and Toe amputation. CURRENT MEDICATIONS       Discharge Medication List as of 1/30/2020  1:34 PM      CONTINUE these medications which have NOT CHANGED    Details   pravastatin (PRAVACHOL) 40 MG tablet Take 1 tablet by mouth daily, Disp-30 tablet, R-5Normal      metoprolol tartrate (LOPRESSOR) 50 MG tablet Take 1.5 tablets by mouth 2 times daily, Disp-90 tablet, R-3Normal      acetaminophen (APAP EXTRA STRENGTH) 500 MG tablet Take 1 tablet by mouth every 6 hours as needed for Pain, Disp-120 tablet, R-0Print      isosorbide mononitrate (IMDUR) 30 MG extended release tablet Take 1 tablet by mouth daily, Disp-30 tablet, R-3Normal      ranolazine (RANEXA) 1000 MG extended release tablet Take 1 tablet by mouth 2 times daily, Disp-90 tablet, R-1Normal      azithromycin (ZITHROMAX Z-CINTHIA) 250 MG tablet 2 tablets day 1 then1 tablet days 2 - 5., Disp-6 tablet, R-0Normal      !! fluticasone (FLONASE) 50 MCG/ACT nasal spray 2 sprays by Nasal route daily Apply daily to each nare, Disp-1 Bottle, R-0Normal      nitroGLYCERIN (NITROSTAT) 0.4 MG SL tablet up to max of 3 total doses.  If no relief after 1 dose, call 911., Disp-25 tablet, R-3Normal      omeprazole (PRILOSEC) 40 MG delayed release capsule Take 1 capsule by mouth daily, Disp-30 capsule, R-3Normal      triamterene-hydrochlorothiazide (MAXZIDE-25) 37.5-25 MG per tablet Take 0.5 tablets by mouth daily, Disp-45 tablet, R-1Normal      lisinopril (PRINIVIL;ZESTRIL) 5 MG tablet Take 1 tablet by mouth daily, Disp-90 tablet, R-2Normal      !! fluticasone (FLONASE) 50 MCG/ACT nasal spray 2 sprays by Nasal route dailyHistorical Med      aspirin EC 81 MG EC tablet Take 1 tablet by mouth daily, Disp-90 tablet, R-3Normal      albuterol sulfate  (90 BASE) MCG/ACT inhaler Inhale 2 puffs into the lungs every 6 hours as needed for WheezingHistorical Med      metFORMIN (GLUCOPHAGE) 500 MG tablet Take 500 mg by mouth 2 times daily (with meals)      traZODone (DESYREL) 150 MG tablet Take 150 mg by mouth nightly      Tiotropium Bromide Monohydrate (SPIRIVA HANDIHALER IN) Inhale into the lungs every morning       citalopram (CELEXA) 20 MG tablet Take 20 mg by mouth daily. !! - Potential duplicate medications found. Please discuss with provider. ALLERGIES     has No Known Allergies. FAMILY HISTORY     He indicated that his mother is . He indicated that his father is . He indicated that six of his eight sisters are alive. He indicated that all of his three brothers are alive. He indicated that his daughter is alive. family history includes Anemia in his daughter; COPD in his father; Cancer in his mother; Coronary Art Dis in his father and mother; Diabetes in his father and mother; Heart Attack in his father and mother; Heart Disease in his father and mother; High Blood Pressure in his father and mother; High Cholesterol in his father and mother; Prostate Cancer (age of onset: 48) in his sister; Stroke in his sister.     SOCIAL HISTORY       Social History     Socioeconomic History    Marital status: Single     Spouse name: Not on file    Number of children: 6    Years of education: 6    Highest education level: Not on file   Occupational History    Occupation: disabled   Social Needs    Financial resource strain: Not on file    and no swelling. Normal sensation noted. Normal strength noted. Comments: There is erythema and very mild tenderness of the ventral aspects of the bilateral forearms without edema or burns. There are otherwise no noted rashes. Patient has full ROM and strength of the upper extremities. There is intact sensation of soft touch in the upper extremities. The upper extremities appear to be neurovascularly intact. Skin:     General: Skin is warm and dry. Coloration: Skin is not pale. Findings: No erythema or rash. Neurological:      Mental Status: He is alert and oriented to person, place, and time. GCS: GCS eye subscore is 4. GCS verbal subscore is 5. GCS motor subscore is 6. Motor: No abnormal muscle tone. Coordination: Coordination normal.   Psychiatric:         Behavior: Behavior normal.         Thought Content: Thought content normal.         DIFFERENTIAL DIAGNOSIS:   Included but not limited to: chemical exposure, dermatitis, contact dermatitis    DIAGNOSTIC RESULTS     EKG: All EKG's are interpreted by the Emergency Department Physician who eithersigns or Co-signs this chart in the absence of a cardiologist.    None    RADIOLOGY: non-plainfilm images(s) such as CT, Ultrasound and MRI are read by the radiologist.  Plain radiographic images are visualized and preliminarily interpreted by the emergency physician unless otherwise stated below. No orders to display         LABS:   Labs Reviewed - No data to display    EMERGENCY DEPARTMENT COURSE:   Vitals:    Vitals:    01/30/20 1248   BP: (!) 152/83   Pulse: 90   Resp: 18   Temp: 97.8 °F (36.6 °C)   TempSrc: Oral   SpO2: 100%   Weight: 214 lb (97.1 kg)     MDM  The patient was seen and evaluated within the ED today with redness and burning of his forearms after a possible chemical exposure at work. Within the department, I observed the patient's vital signs to be within acceptable range.   On exam, I appreciated erythema and very mild tenderness of the ventral aspects of the bilateral forearms without edema or burns. There are otherwise no noted rashes. Patient has full ROM and strength of the upper extremities. There is intact sensation of soft touch in the upper extremities. The upper extremities appear to be neurovascularly intact. Within the department, the patient was treated with Toradol, Solumedrol, Benadryl, and pepcid. I observed the patient's condition to remain stable during the duration of the stay. I explained my proposed course of treatment to the patient, who was amenable to my treatment and discharge decisions. The patient was discharged home in stable condition with prescriptions for Ibuprofen, Medrol dosepak, Benadryl, and Pepcid, and the patient will return to the ED if the symptoms become more severe in nature or otherwise change. CRITICAL CARE:   None    CONSULTS:  None    PROCEDURES:  None    FINAL IMPRESSION     1. Irritant contact dermatitis, unspecified trigger          DISPOSITION/PLAN   I have given the patient strict written and verbal instructions about care at home, follow-up, and signs and symptoms of worsening of condition, and the patient did verbalize understanding of these instructions. Patient was discharged in stable condition. Will return if symptoms change or worsen, or for any sign or symptom deemed emergent by the patient or family members. Follow up as an outpatient or sooner if symptoms warrant. PATIENT REFERREDTO:  [address] [place] [address],[address]  [address] [place] [address] Rd  Call in 1 day  As needed, If symptoms worsen, For re-check      DISCHARGE MEDICATIONS:  Discharge Medication List as of 1/30/2020  1:34 PM      START taking these medications    Details   methylPREDNISolone (MEDROL, CINTHIA,) 4 MG tablet Take by mouth., Disp-1 kit, R-0Print      diphenhydrAMINE (BENADRYL) 25 MG capsule Take 1 capsule by mouth every 4 hours as needed for

## 2020-01-30 NOTE — LETTER
[facility] [address] [address] EMERGENCY DEPT  [facility] [address]  Phone: [phone]               January 30, 2020    Patient: Melinda Becker   YOB: 1970   Date of Visit: 1/30/2020       To Whom It May Concern: Luz Maria Ramos was seen and treated in our emergency department on 1/30/2020. He may return to work on 1-.       Sincerely,       Jace Moran RN         Signature:__________________________________

## 2020-01-30 NOTE — ED NOTES
Pt cleaning arms with warm soapy water and wash clothes at this time.      Karyle Alley, RN  01/30/20 2157

## 2020-03-09 RX ORDER — ISOSORBIDE MONONITRATE 30 MG/1
TABLET, EXTENDED RELEASE ORAL
Qty: 90 TABLET | Refills: 0 | Status: SHIPPED | OUTPATIENT
Start: 2020-03-09

## 2020-03-18 ENCOUNTER — PROCEDURE VISIT (OUTPATIENT)
Dept: CARDIOLOGY CLINIC | Age: 50
End: 2020-03-18
Payer: MEDICAID

## 2020-03-18 PROCEDURE — 93296 REM INTERROG EVL PM/IDS: CPT | Performed by: INTERNAL MEDICINE

## 2020-03-18 PROCEDURE — 93295 DEV INTERROG REMOTE 1/2/MLT: CPT | Performed by: INTERNAL MEDICINE

## 2020-03-18 NOTE — PROGRESS NOTES
Medtronic single icd  Battery 5.7yrs  V paced <0.1%  r wave 13.8mv  Ventricle impedance 437ohms  Shock 71 ohms

## 2020-06-23 ENCOUNTER — PROCEDURE VISIT (OUTPATIENT)
Dept: CARDIOLOGY CLINIC | Age: 50
End: 2020-06-23
Payer: MEDICAID

## 2020-06-23 PROCEDURE — 93296 REM INTERROG EVL PM/IDS: CPT | Performed by: INTERNAL MEDICINE

## 2020-06-23 PROCEDURE — 93295 DEV INTERROG REMOTE 1/2/MLT: CPT | Performed by: INTERNAL MEDICINE

## 2020-06-26 ENCOUNTER — TELEPHONE (OUTPATIENT)
Dept: CARDIOLOGY CLINIC | Age: 50
End: 2020-06-26

## 2020-06-26 NOTE — TELEPHONE ENCOUNTER
RECIEVED AN UNSCHEDULED TRANSMISSION SHOWING PT HAS POSSIBLE FLUID ACCUMULATION. HIS PHONE DOES NOT ACCEPT CALLS AT THIS TIME. I LM FOR SON TO HAVE HIS DAD CALL THIS OFFICE . NO INFORMATION WAS GIVEN.  JUST ASKED HIM TO HAVE HIS DAD CALL OUR OFFICE     HIS OPTIVOL IS SLIGHTLY ELEVATED DR Orlin White

## 2020-06-26 NOTE — LETTER
[address] Cardiology  [place] [address]  [place] [address]  Phone: [phone]  Fax: [phone]    José Martino MD        July 15, 2020    [facility] [address]      Dear Irma Hinojosa:    Our office has been trying to reach you and leaving messages on your phone. If you could please give our office a call at [phone] option 3 for nurse line. If you have any questions or concerns, please don't hesitate to call.     Sincerely,        José Martino MD

## 2020-07-15 NOTE — TELEPHONE ENCOUNTER
Letter mailed and typed to the pt asking him to please call this office so we can make him an appt to see dr Praveen Barber for his slightly elevated optivol

## 2020-07-22 ENCOUNTER — TELEPHONE (OUTPATIENT)
Dept: CARDIOLOGY CLINIC | Age: 50
End: 2020-07-22

## 2020-07-22 NOTE — LETTER
[address] [facility] Cardiology  [facility]  [address]  [facility] [address]  Phone: [phone]  Fax: [phone]      July 22, 2020     [address] [facility]      Dear [employer]:    I am writing you because I have been informed of your missed appointment(s). We care about you and the management of your healthcare and want to make sure that you follow up as recommended. We're sorry you were unable to keep your appointment and hope that you are doing well. We would like to continue treating your healthcare needs. Please call the office to let us know your plans for treatment and to reschedule your appointment.      Sincerely,        Alisa Tapia MD

## 2020-10-08 ENCOUNTER — TELEPHONE (OUTPATIENT)
Dept: CARDIOLOGY CLINIC | Age: 50
End: 2020-10-08

## 2020-10-08 NOTE — LETTER
.. [address] [place] [address]  Phone: [phone]  Fax: [phone]        October 8, 2020    Kemal Haynes [address]      Dear Simin Better: Lexi Greer We have been unable to contact you by phone. Our office did not receive your Care[employer] transmission which was scheduled for September 29, 2020. Our website indicates your monitor is disconnected. Please check the connections and send a manual download ASAP. If the monitor appears to be connected and did not send, unplug it for three seconds, plug it back in and try to resend. If you need help sending a download, please call [employer] at [phone]. Our office is not responsible for missed transmissions. Please call our office at [phone] if you have questions for the pacemaker/ICD clinic     Thank You for your assistance!     [facility]'s Pacemaker/ICD clinic

## 2020-10-16 ENCOUNTER — APPOINTMENT (OUTPATIENT)
Dept: GENERAL RADIOLOGY | Age: 50
End: 2020-10-16
Payer: MEDICAID

## 2020-10-16 ENCOUNTER — HOSPITAL ENCOUNTER (EMERGENCY)
Age: 50
Discharge: HOME | End: 2020-10-16
Attending: EMERGENCY MEDICINE
Payer: MEDICAID

## 2020-10-16 VITALS
DIASTOLIC BLOOD PRESSURE: 91 MMHG | TEMPERATURE: 98.5 F | BODY MASS INDEX: 29.96 KG/M2 | OXYGEN SATURATION: 100 % | SYSTOLIC BLOOD PRESSURE: 150 MMHG | WEIGHT: 214 LBS | RESPIRATION RATE: 16 BRPM | HEIGHT: 71 IN | HEART RATE: 87 BPM

## 2020-10-16 LAB
ALBUMIN SERPL-MCNC: 4.1 G/DL (ref 3.5–5.1)
ALP BLD-CCNC: 78 U/L (ref 38–126)
ALT SERPL-CCNC: 11 U/L (ref 11–66)
ANION GAP SERPL CALCULATED.3IONS-SCNC: 12 MEQ/L (ref 8–16)
AST SERPL-CCNC: 13 U/L (ref 5–40)
BASOPHILS # BLD: 0.6 %
BASOPHILS ABSOLUTE: 0.1 THOU/MM3 (ref 0–0.1)
BILIRUB SERPL-MCNC: 0.3 MG/DL (ref 0.3–1.2)
BUN BLDV-MCNC: 10 MG/DL (ref 7–22)
CALCIUM SERPL-MCNC: 9.4 MG/DL (ref 8.5–10.5)
CHLORIDE BLD-SCNC: 100 MEQ/L (ref 98–111)
CO2: 22 MEQ/L (ref 23–33)
CREAT SERPL-MCNC: 0.8 MG/DL (ref 0.4–1.2)
EKG ATRIAL RATE: 89 BPM
EKG ATRIAL RATE: 90 BPM
EKG P AXIS: 71 DEGREES
EKG P AXIS: 74 DEGREES
EKG P-R INTERVAL: 172 MS
EKG P-R INTERVAL: 174 MS
EKG Q-T INTERVAL: 324 MS
EKG Q-T INTERVAL: 352 MS
EKG QRS DURATION: 74 MS
EKG QRS DURATION: 82 MS
EKG QTC CALCULATION (BAZETT): 396 MS
EKG QTC CALCULATION (BAZETT): 428 MS
EKG R AXIS: 56 DEGREES
EKG R AXIS: 61 DEGREES
EKG T AXIS: 15 DEGREES
EKG T AXIS: 26 DEGREES
EKG VENTRICULAR RATE: 89 BPM
EKG VENTRICULAR RATE: 90 BPM
EOSINOPHIL # BLD: 3.1 %
EOSINOPHILS ABSOLUTE: 0.3 THOU/MM3 (ref 0–0.4)
ERYTHROCYTE [DISTWIDTH] IN BLOOD BY AUTOMATED COUNT: 13.7 % (ref 11.5–14.5)
ERYTHROCYTE [DISTWIDTH] IN BLOOD BY AUTOMATED COUNT: 42.4 FL (ref 35–45)
GFR SERPL CREATININE-BSD FRML MDRD: > 90 ML/MIN/1.73M2
GLUCOSE BLD-MCNC: 197 MG/DL (ref 70–108)
HCT VFR BLD CALC: 46 % (ref 42–52)
HEMOGLOBIN: 16.5 GM/DL (ref 14–18)
IMMATURE GRANS (ABS): 0.04 THOU/MM3 (ref 0–0.07)
IMMATURE GRANULOCYTES: 0.4 %
LYMPHOCYTES # BLD: 40.9 %
LYMPHOCYTES ABSOLUTE: 4.5 THOU/MM3 (ref 1–4.8)
MCH RBC QN AUTO: 30.9 PG (ref 26–33)
MCHC RBC AUTO-ENTMCNC: 35.9 GM/DL (ref 32.2–35.5)
MCV RBC AUTO: 86.1 FL (ref 80–94)
MONOCYTES # BLD: 5.5 %
MONOCYTES ABSOLUTE: 0.6 THOU/MM3 (ref 0.4–1.3)
NUCLEATED RED BLOOD CELLS: 0 /100 WBC
OSMOLALITY CALCULATION: 272.8 MOSMOL/KG (ref 275–300)
PLATELET # BLD: 234 THOU/MM3 (ref 130–400)
PMV BLD AUTO: 10.8 FL (ref 9.4–12.4)
POTASSIUM SERPL-SCNC: 3.9 MEQ/L (ref 3.5–5.2)
RBC # BLD: 5.34 MILL/MM3 (ref 4.7–6.1)
SEG NEUTROPHILS: 49.5 %
SEGMENTED NEUTROPHILS ABSOLUTE COUNT: 5.4 THOU/MM3 (ref 1.8–7.7)
SODIUM BLD-SCNC: 134 MEQ/L (ref 135–145)
TOTAL PROTEIN: 7.2 G/DL (ref 6.1–8)
TROPONIN T: < 0.01 NG/ML
WBC # BLD: 11 THOU/MM3 (ref 4.8–10.8)

## 2020-10-16 PROCEDURE — 84484 ASSAY OF TROPONIN QUANT: CPT

## 2020-10-16 PROCEDURE — 99283 EMERGENCY DEPT VISIT LOW MDM: CPT

## 2020-10-16 PROCEDURE — 99284 EMERGENCY DEPT VISIT MOD MDM: CPT

## 2020-10-16 PROCEDURE — 6360000002 HC RX W HCPCS: Performed by: EMERGENCY MEDICINE

## 2020-10-16 PROCEDURE — 80053 COMPREHEN METABOLIC PANEL: CPT

## 2020-10-16 PROCEDURE — 72040 X-RAY EXAM NECK SPINE 2-3 VW: CPT

## 2020-10-16 PROCEDURE — 96372 THER/PROPH/DIAG INJ SC/IM: CPT

## 2020-10-16 PROCEDURE — 93005 ELECTROCARDIOGRAM TRACING: CPT | Performed by: EMERGENCY MEDICINE

## 2020-10-16 PROCEDURE — 85025 COMPLETE CBC W/AUTO DIFF WBC: CPT

## 2020-10-16 PROCEDURE — 36415 COLL VENOUS BLD VENIPUNCTURE: CPT

## 2020-10-16 PROCEDURE — 6370000000 HC RX 637 (ALT 250 FOR IP): Performed by: EMERGENCY MEDICINE

## 2020-10-16 RX ORDER — TRAMADOL HYDROCHLORIDE 50 MG/1
100 TABLET ORAL ONCE
Status: COMPLETED | OUTPATIENT
Start: 2020-10-16 | End: 2020-10-16

## 2020-10-16 RX ORDER — KETOROLAC TROMETHAMINE 30 MG/ML
30 INJECTION, SOLUTION INTRAMUSCULAR; INTRAVENOUS ONCE
Status: DISCONTINUED | OUTPATIENT
Start: 2020-10-16 | End: 2020-10-16

## 2020-10-16 RX ORDER — TRAMADOL HYDROCHLORIDE 50 MG/1
50 TABLET ORAL EVERY 4 HOURS PRN
Qty: 18 TABLET | Refills: 0 | Status: SHIPPED | OUTPATIENT
Start: 2020-10-16 | End: 2020-10-21

## 2020-10-16 RX ORDER — ORPHENADRINE CITRATE 30 MG/ML
60 INJECTION INTRAMUSCULAR; INTRAVENOUS ONCE
Status: COMPLETED | OUTPATIENT
Start: 2020-10-16 | End: 2020-10-16

## 2020-10-16 RX ORDER — METHYLPREDNISOLONE 4 MG/1
TABLET ORAL
Qty: 1 KIT | Refills: 0 | Status: SHIPPED | OUTPATIENT
Start: 2020-10-16 | End: 2020-10-22

## 2020-10-16 RX ORDER — PREDNISONE 20 MG/1
40 TABLET ORAL ONCE
Status: COMPLETED | OUTPATIENT
Start: 2020-10-16 | End: 2020-10-16

## 2020-10-16 RX ADMIN — PREDNISONE 40 MG: 20 TABLET ORAL at 04:51

## 2020-10-16 RX ADMIN — ORPHENADRINE CITRATE 60 MG: 30 INJECTION INTRAMUSCULAR; INTRAVENOUS at 04:53

## 2020-10-16 RX ADMIN — TRAMADOL HYDROCHLORIDE 100 MG: 50 TABLET, FILM COATED ORAL at 04:52

## 2020-10-16 ASSESSMENT — PAIN SCALES - GENERAL
PAINLEVEL_OUTOF10: 10
PAINLEVEL_OUTOF10: 8
PAINLEVEL_OUTOF10: 10

## 2020-10-16 ASSESSMENT — ENCOUNTER SYMPTOMS
EYE DISCHARGE: 0
SORE THROAT: 0
EYE ITCHING: 0
ABDOMINAL PAIN: 0
DIARRHEA: 0
ABDOMINAL DISTENTION: 0
SHORTNESS OF BREATH: 0
CONSTIPATION: 0
STRIDOR: 0
NAUSEA: 0
WHEEZING: 0
EYE PAIN: 0
COUGH: 0
PHOTOPHOBIA: 0
VOMITING: 0
RHINORRHEA: 0
BACK PAIN: 1
EYE REDNESS: 0
CHEST TIGHTNESS: 0

## 2020-10-16 ASSESSMENT — PAIN DESCRIPTION - PAIN TYPE: TYPE: ACUTE PAIN

## 2020-10-16 ASSESSMENT — PAIN DESCRIPTION - LOCATION: LOCATION: NECK;SHOULDER

## 2020-10-16 NOTE — ED TRIAGE NOTES
Pt presents to the ED with c/o neck pain and left shoulder pain. Pt states that he has been having neck and shoulder pain for 8 days. Pt states he was at [facility] four days ago and was given ibuprofen and muscle relaxers. Pt tells this RN that there was no injury. Pt alert and oriented. Pt breathing easy and unlabored. Pt VS obtained. Dr. Sidra Schreiber at bedside.

## 2020-10-16 NOTE — ED PROVIDER NOTES
[address] ENCOUNTER      PATIENT NAME: Carson Carter  MRN: [id number]  : 1970  JACKMAN: 10/16/2020  PROVIDER: Darren Vieira MD      CHIEF COMPLAINT       Chief Complaint   Patient presents with    Shoulder Pain     left    Neck Pain       Nurses Notes reviewed and I agreeexcept as noted in the HPI. HISTORY OF PRESENT ILLNESS    Carson Carter is a 45 y.o. male who presents to Emergency Department with Shoulder Pain (left) and Neck Pain    48-year-old male with past medical history of CAD, diabetes, hyperlipidemia and AICD placement presents to ED complaining of left-sided neck pain in the last 8 days duration. Patient first presented to [facility] 4 days ago was diagnosed to muscle strain. Patient was given ibuprofen and muscle relaxant (he could not remember the name) but patient did not feel much improvement. Patient's pain is worse on neck movement. Pain is sharp, shooting, radiating to left shoulder, pain intensity now is at 7/10. No recalled shoulder or neck injury. No fever no chills. No upper extremity weakness. No chest pain. This HPI was provided by the patient. REVIEW OF SYSTEMS     Review of Systems   Constitutional: Negative for activity change, appetite change, chills, fatigue, fever and unexpected weight change. HENT: Negative for congestion, ear discharge, ear pain, hearing loss, nosebleeds, rhinorrhea and sore throat. Eyes: Negative for photophobia, pain, discharge, redness and itching. Respiratory: Negative for cough, chest tightness, shortness of breath, wheezing and stridor. Cardiovascular: Negative for chest pain, palpitations and leg swelling. Gastrointestinal: Negative for abdominal distention, abdominal pain, constipation, diarrhea, nausea and vomiting. Endocrine: Negative for cold intolerance, heat intolerance, polydipsia and polyphagia.    Genitourinary: Negative for dysuria, flank pain, frequency and hematuria. Musculoskeletal: Positive for back pain and neck pain. Negative for arthralgias, gait problem, myalgias and neck stiffness. Skin: Negative for pallor, rash and wound. Allergic/Immunologic: Negative for environmental allergies and food allergies. Neurological: Negative for dizziness, tremors, syncope, weakness and headaches. Psychiatric/Behavioral: Negative for agitation, behavioral problems, confusion, self-injury, sleep disturbance and suicidal ideas. PAST MEDICAL HISTORY     Past Medical History:   Diagnosis Date    Allergic rhinitis     Anxiety     Arthritis     Asthma     CAD (coronary artery disease)     Depression     Diabetes mellitus ([facility] [address]     GERD (gastroesophageal reflux disease)     Hyperlipidemia     Hypertension     Insomnia     Ischemic cardiomyopathy 10/24/2014    Pneumonia     S/P ICD (internal cardiac defibrillator) procedure: 10/24/2014: Medtronic Single Chamber 10/24/2014    10/24/2014: Medtronic Single Chamber.  Dr. Danette Whitfield       Past Surgical History:   Procedure Laterality Date    CORONARY ANGIOPLASTY WITH STENT PLACEMENT  2002?    x2 @ [address] Right 2017    R eye cyst removal    CYST REMOVAL Right 06/21/2017    eye    ELBOW SURGERY Left     PACEMAKER PLACEMENT      TOE AMPUTATION         CURRENT MEDICATIONS       Discharge Medication List as of 10/16/2020  6:14 AM      CONTINUE these medications which have NOT CHANGED    Details   ibuprofen (ADVIL;MOTRIN) 600 MG tablet Take 1 tablet by mouth every 6 hours as needed for Pain, Disp-30 tablet, R-0Print      isosorbide mononitrate (IMDUR) 30 MG extended release tablet take 1 tablet by mouth once daily, Disp-90 tablet, R-0Normal      famotidine (PEPCID) 20 MG tablet Take 1 tablet by mouth 2 times daily, Disp-60 tablet, R-0Print      pravastatin (PRAVACHOL) 40 MG tablet Take 1 tablet by mouth daily, Disp-30 tablet, R-5Normal . He indicated that his father is . He indicated that six of his eight sisters are alive. He indicated that all of his three brothers are alive. He indicated that his daughter is alive. family history includes Anemia in his daughter; COPD in his father; Cancer in his mother; Coronary Art Dis in his father and mother; Diabetes in his father and mother; Heart Attack in his father and mother; Heart Disease in his father and mother; High Blood Pressure in his father and mother; High Cholesterol in his father and mother; Prostate Cancer (age of onset: 48) in his sister; Stroke in his sister. SOCIAL HISTORY      reports that he has quit smoking. His smoking use included cigarettes. He has a 14.50 pack-year smoking history. He has never used smokeless tobacco. He reports current alcohol use. He reports that he does not use drugs. PHYSICAL EXAM     INITIAL VITALS:  height is 5' 11\" (1.803 m) and weight is 214 lb (97.1 kg). His oral temperature is 98.5 °F (36.9 °C). His blood pressure is 150/91 (abnormal) and his pulse is 87. His respiration is 16 and oxygen saturation is 100%. Physical Exam  Vitals signs and nursing note reviewed. Constitutional:       Appearance: He is well-developed. He is not diaphoretic. HENT:      Head: Normocephalic and atraumatic. Nose: Nose normal.   Eyes:      General: No scleral icterus. Right eye: No discharge. Left eye: No discharge. Conjunctiva/sclera: Conjunctivae normal.      Pupils: Pupils are equal, round, and reactive to light. Neck:      Musculoskeletal: Normal range of motion and neck supple. Vascular: No JVD. Trachea: No tracheal deviation. Cardiovascular:      Rate and Rhythm: Normal rate and regular rhythm. Heart sounds: Normal heart sounds. No murmur. No friction rub. No gallop. Pulmonary:      Effort: Pulmonary effort is normal. No respiratory distress. Breath sounds: Normal breath sounds. No stridor. No wheezing or rales. Chest:      Chest wall: No tenderness. Abdominal:      General: Bowel sounds are normal. There is no distension. Palpations: Abdomen is soft. There is no mass. Tenderness: There is no abdominal tenderness. There is no guarding or rebound. Hernia: No hernia is present. Musculoskeletal:         General: Tenderness present. No deformity. Comments: Tenderness to left paracervical and left upper back as well as from the left supraspinatus area. Lymphadenopathy:      Cervical: No cervical adenopathy. Skin:     General: Skin is warm and dry. Capillary Refill: Capillary refill takes less than 2 seconds. Coloration: Skin is not pale. Findings: No erythema or rash. Neurological:      Mental Status: He is alert and oriented to person, place, and time. Cranial Nerves: No cranial nerve deficit. Sensory: No sensory deficit. Motor: No abnormal muscle tone. Coordination: Coordination normal.      Deep Tendon Reflexes: Reflexes normal.      Comments: Positive left Spurling test with pain reproduced from C4-C5 area. Psychiatric:         Behavior: Behavior normal.         Thought Content: Thought content normal.         Judgment: Judgment normal.         DIFFERENTIAL DIAGNOSIS:   Musculoskeletal pain, cervical radiculopathy, rule out AMI    DIAGNOSTIC RESULTS   EKG: All EKG's are interpreted by the Emergency Department Physician who either signs or Co-signsthis chart in the absence of a cardiologist.  Interpreted by me  Normal sinus rhythm  Normal IA and QRS duration  Normal QT  No acute ischemic changes    RADIOLOGY: non-plain film images(s) such as CT, Ultrasound and MRI are read by the radiologist.  XR CERVICAL SPINE (2-3 VIEWS)   Final Result      Minimal spondylosis. Left C4-C5 facet arthropathy.            LABS:   Results for orders placed or performed during the hospital encounter of 10/16/20   CBC auto differential   Result Value Ref Range    WBC 11.0 (H) 4.8 - 10.8 thou/mm3    RBC 5.34 4.70 - 6.10 mill/mm3    Hemoglobin 16.5 14.0 - 18.0 gm/dl    Hematocrit 46.0 42.0 - 52.0 %    MCV 86.1 80.0 - 94.0 fL    MCH 30.9 26.0 - 33.0 pg    MCHC 35.9 (H) 32.2 - 35.5 gm/dl    RDW-CV 13.7 11.5 - 14.5 %    RDW-SD 42.4 35.0 - 45.0 fL    Platelets 707 306 - 362 thou/mm3    MPV 10.8 9.4 - 12.4 fL    Seg Neutrophils 49.5 %    Lymphocytes 40.9 %    Monocytes 5.5 %    Eosinophils 3.1 %    Basophils 0.6 %    Immature Granulocytes 0.4 %    Segs Absolute 5.4 1.8 - 7.7 thou/mm3    Lymphocytes Absolute 4.5 1.0 - 4.8 thou/mm3    Monocytes Absolute 0.6 0.4 - 1.3 thou/mm3    Eosinophils Absolute 0.3 0.0 - 0.4 thou/mm3    Basophils Absolute 0.1 0.0 - 0.1 thou/mm3    Immature Grans (Abs) 0.04 0.00 - 0.07 thou/mm3    nRBC 0 /100 wbc   Comprehensive Metabolic Panel   Result Value Ref Range    Glucose 197 (H) 70 - 108 mg/dL    CREATININE 0.8 0.4 - 1.2 mg/dL    BUN 10 7 - 22 mg/dL    Sodium 134 (L) 135 - 145 meq/L    Potassium 3.9 3.5 - 5.2 meq/L    Chloride 100 98 - 111 meq/L    CO2 22 (L) 23 - 33 meq/L    Calcium 9.4 8.5 - 10.5 mg/dL    AST 13 5 - 40 U/L    Alkaline Phosphatase 78 38 - 126 U/L    Total Protein 7.2 6.1 - 8.0 g/dL    Alb 4.1 3.5 - 5.1 g/dL    Total Bilirubin 0.3 0.3 - 1.2 mg/dL   Troponin   Result Value Ref Range    Troponin T < 0.010 ng/ml   Anion Gap   Result Value Ref Range    Anion Gap 12.0 8.0 - 16.0 meq/L   Glomerular Filtration Rate, Estimated   Result Value Ref Range    Est, Glom Filt Rate >90 ml/min/1.73m2   Osmolality   Result Value Ref Range    Osmolality Calc 272.8 (L) 275.0 - 300.0 mOsmol/kg       EMERGENCY DEPARTMENT COURSE:   Vitals:    Vitals:    10/16/20 0418 10/16/20 0458   BP: (!) 131/106 (!) 150/91   Pulse: 92 87   Resp: 16 16   Temp: 98.5 °F (36.9 °C)    TempSrc: Oral    SpO2: 98% 100%   Weight: 214 lb (97.1 kg)    Height: 5' 11\" (1.803 m)      4:47 AM: Patient is seen and evaluated in a timely fashion.      ACTIONS:  Large bore IV  Tele monitor  EKG 12-LEAD  XR CERVICAL SPINE (2-3 VIEWS)  Labs Reviewed   CBC WITH AUTO DIFFERENTIAL   COMPREHENSIVE METABOLIC PANEL   TROPONIN     Medications   orphenadrine (NORFLEX) injection 60 mg (has no administration in time range)   predniSONE (DELTASONE) tablet 40 mg (has no administration in time range)   traMADol (ULTRAM) tablet 100 mg (has no administration in time range)       MEDICAL DECISION MAKINGS:    Patient's history and physical exam are consistent with cervical radiculopathy likely from C4-C5. EKG does not reveal acute ischemic changes. Laboratory results were reassuring including normal troponin. Patient was medicated with oral tramadol and prednisone. Patient's pain improved on reassessment. Cervical spine x-ray: Minimal spondylosis, left C4-C5 facet arthropathy    I suggest the patient should be followed closely by PCP in 1 week to consider cervical spine MRI. Given that patient is taking NSAIDs and muscle relaxant with poor pain control man, I feel a course of low potency narcotics such as tramadol is appropriate. Patient is prescribed tramadol and Medrol Dosepak. At the same time, he can continue taking ibuprofen and a muscle relaxant. PCP follow-up in 1 week. CRITICAL CARE:   None    CONSULTS:  None    PROCEDURES:  None    FINAL IMPRESSION      1. Cervical radiculopathy at C5          DISPOSITION/PLAN   Home    PATIENT REFERRED TO:  Your family doctor    In 1 week  ED discharge follow up. Consider cervical spine MRI. DISCHARGE MEDICATIONS:  Discharge Medication List as of 10/16/2020  6:14 AM      START taking these medications    Details   methylPREDNISolone (MEDROL, CINTHIA,) 4 MG tablet Take by mouth., Disp-1 kit,R-0Print      traMADol (ULTRAM) 50 MG tablet Take 1 tablet by mouth every 4 hours as needed for Pain for up to 5 days. Intended supply: 3 days.  Take lowest dose possible to manage pain, Disp-18 tablet,R-0Print             (Please note that portions of this note

## 2020-10-16 NOTE — ED NOTES
Pt medicated and educated. Pt aware of POC to go to xray. Will reassess pt pain.       Jass Leon RN  10/16/20 6546

## 2020-10-22 ENCOUNTER — PROCEDURE VISIT (OUTPATIENT)
Dept: CARDIOLOGY CLINIC | Age: 50
End: 2020-10-22
Payer: MEDICAID

## 2020-10-22 PROCEDURE — 93295 DEV INTERROG REMOTE 1/2/MLT: CPT | Performed by: INTERNAL MEDICINE

## 2020-10-22 PROCEDURE — 93296 REM INTERROG EVL PM/IDS: CPT | Performed by: INTERNAL MEDICINE

## 2021-02-01 ENCOUNTER — NURSE ONLY (OUTPATIENT)
Dept: CARDIOLOGY CLINIC | Age: 51
End: 2021-02-01
Payer: MEDICAID

## 2021-02-01 DIAGNOSIS — Z95.810 S/P ICD (INTERNAL CARDIAC DEFIBRILLATOR) PROCEDURE: Primary | ICD-10-CM

## 2021-02-01 PROCEDURE — 93282 PRGRMG EVAL IMPLANTABLE DFB: CPT | Performed by: INTERNAL MEDICINE

## 2021-02-01 NOTE — PROGRESS NOTES
medtronic single icd in office     5.4 years on device   Rv imped 437  Shock 69  0% paced   optivol WNL  R waves 13.1  Threshold 0.75 @ 0.4    States monitor was broken, given number to call medtronic for new monitor.   Does has appt in 3 months if he does not call     New appt scheduled with Josefa, aware he NS'd his appt in July

## 2021-03-25 ENCOUNTER — PROCEDURE VISIT (OUTPATIENT)
Dept: CARDIOLOGY CLINIC | Age: 51
End: 2021-03-25
Payer: MEDICAID

## 2021-03-25 DIAGNOSIS — Z95.810 S/P ICD (INTERNAL CARDIAC DEFIBRILLATOR) PROCEDURE: Primary | ICD-10-CM

## 2021-03-25 PROCEDURE — 93296 REM INTERROG EVL PM/IDS: CPT | Performed by: INTERNAL MEDICINE

## 2021-03-25 PROCEDURE — 93295 DEV INTERROG REMOTE 1/2/MLT: CPT | Performed by: INTERNAL MEDICINE

## 2021-03-25 NOTE — PROGRESS NOTES
DR Stephon Lopez PT   MEDTRONIC SINGLE ICD REMOTE   BATTERY 5.2 YRS REMAINING   RV IMPEDENCE 437  RV 77  RV WAVES 8.9  RV THRESHOLD PER THE DEVICE 1 @ 0.4  VENT AMPLITUDE 2 @ 0.4  VVI 40  V PACED <0.1%  5 NS HIGH VENT RATE EPISODES  LOOKS LIKE NSVT AND SVT   OPTIVOL WNL

## 2021-04-06 ENCOUNTER — HOSPITAL ENCOUNTER (EMERGENCY)
Age: 51
Discharge: HOME | End: 2021-04-06
Payer: MEDICAID

## 2021-04-06 VITALS
TEMPERATURE: 97.5 F | BODY MASS INDEX: 30.38 KG/M2 | DIASTOLIC BLOOD PRESSURE: 79 MMHG | OXYGEN SATURATION: 97 % | RESPIRATION RATE: 18 BRPM | HEIGHT: 71 IN | HEART RATE: 102 BPM | SYSTOLIC BLOOD PRESSURE: 133 MMHG | WEIGHT: 217 LBS

## 2021-04-06 DIAGNOSIS — J30.9 ALLERGIC SINUSITIS: Primary | ICD-10-CM

## 2021-04-06 PROCEDURE — 99213 OFFICE O/P EST LOW 20 MIN: CPT | Performed by: NURSE PRACTITIONER

## 2021-04-06 PROCEDURE — 99213 OFFICE O/P EST LOW 20 MIN: CPT

## 2021-04-06 RX ORDER — BROMPHENIRAMINE MALEATE, PSEUDOEPHEDRINE HYDROCHLORIDE, AND DEXTROMETHORPHAN HYDROBROMIDE 2; 30; 10 MG/5ML; MG/5ML; MG/5ML
5 SYRUP ORAL 4 TIMES DAILY PRN
Qty: 60 ML | Refills: 0 | Status: SHIPPED | OUTPATIENT
Start: 2021-04-06

## 2021-04-06 RX ORDER — PREDNISONE 20 MG/1
20 TABLET ORAL DAILY
Qty: 5 TABLET | Refills: 0 | Status: SHIPPED | OUTPATIENT
Start: 2021-04-06 | End: 2021-04-11

## 2021-04-06 ASSESSMENT — ENCOUNTER SYMPTOMS
CHEST TIGHTNESS: 0
COUGH: 1
SINUS PRESSURE: 1
FACIAL SWELLING: 0
WHEEZING: 0
APNEA: 0
SWOLLEN GLANDS: 0
SINUS PAIN: 1
VOMITING: 0
ABDOMINAL PAIN: 0
SHORTNESS OF BREATH: 0
DIARRHEA: 0
RHINORRHEA: 0
CHOKING: 0
NAUSEA: 0
STRIDOR: 0
CONSTIPATION: 0
SORE THROAT: 0

## 2021-04-06 NOTE — ED PROVIDER NOTES
arthralgias, myalgias and neck pain. Neurological: Positive for headaches. Negative for dizziness and light-headedness. Psychiatric/Behavioral: Negative for sleep disturbance. PAST MEDICAL HISTORY         Diagnosis Date    Allergic rhinitis     Anxiety     Arthritis     Asthma     CAD (coronary artery disease)     Depression     Diabetes mellitus ([facility] [address]     GERD (gastroesophageal reflux disease)     Hyperlipidemia     Hypertension     Insomnia     Ischemic cardiomyopathy 10/24/2014    Pneumonia     S/P ICD (internal cardiac defibrillator) procedure: 10/24/2014: Medtronic Single Chamber 10/24/2014    10/24/2014: Medtronic Single Chamber. Dr. Renato Quesada     Patient  has a past surgical history that includes Coronary angioplasty with stent (2002?); pacemaker placement; cyst removal (Right, 06/21/2017); Elbow surgery (Left); Cosmetic surgery (Right, 2017); and Toe amputation.     CURRENT MEDICATIONS       Discharge Medication List as of 4/6/2021  3:02 PM      CONTINUE these medications which have NOT CHANGED    Details   isosorbide mononitrate (IMDUR) 30 MG extended release tablet take 1 tablet by mouth once daily, Disp-90 tablet, R-0Normal      pravastatin (PRAVACHOL) 40 MG tablet Take 1 tablet by mouth daily, Disp-30 tablet, R-5Normal      metoprolol tartrate (LOPRESSOR) 50 MG tablet Take 1.5 tablets by mouth 2 times daily, Disp-90 tablet, R-3Normal      ranolazine (RANEXA) 1000 MG extended release tablet Take 1 tablet by mouth 2 times daily, Disp-90 tablet, R-1Normal      fluticasone (FLONASE) 50 MCG/ACT nasal spray 2 sprays by Nasal route daily Apply daily to each nare, Disp-1 Bottle, R-0Normal      omeprazole (PRILOSEC) 40 MG delayed release capsule Take 1 capsule by mouth daily, Disp-30 capsule, R-3Normal      lisinopril (PRINIVIL;ZESTRIL) 5 MG tablet Take 1 tablet by mouth daily, Disp-90 tablet, R-2Normal      aspirin EC 81 MG EC tablet Take 1 tablet by mouth daily, Disp-90 tablet, R-3Normal      albuterol sulfate  (90 BASE) MCG/ACT inhaler Inhale 2 puffs into the lungs every 6 hours as needed for WheezingHistorical Med      metFORMIN (GLUCOPHAGE) 500 MG tablet Take 500 mg by mouth 2 times daily (with meals)      traZODone (DESYREL) 150 MG tablet Take 150 mg by mouth nightly      Tiotropium Bromide Monohydrate (SPIRIVA HANDIHALER IN) Inhale into the lungs every morning       citalopram (CELEXA) 20 MG tablet Take 20 mg by mouth daily. ibuprofen (ADVIL;MOTRIN) 600 MG tablet Take 1 tablet by mouth every 6 hours as needed for Pain, Disp-30 tablet, R-0Print      nitroGLYCERIN (NITROSTAT) 0.4 MG SL tablet up to max of 3 total doses. If no relief after 1 dose, call 911., Disp-25 tablet, R-3Normal             ALLERGIES     Patient is has No Known Allergies. FAMILY HISTORY     Patient's family history includes Anemia in his daughter; COPD in his father; Cancer in his mother; Coronary Art Dis in his father and mother; Diabetes in his father and mother; Heart Attack in his father and mother; Heart Disease in his father and mother; High Blood Pressure in his father and mother; High Cholesterol in his father and mother; Prostate Cancer (age of onset: 48) in his sister; Stroke in his sister. SOCIAL HISTORY     Patient  reports that he has quit smoking. His smoking use included cigarettes. He has a 14.50 pack-year smoking history. He has never used smokeless tobacco. He reports current alcohol use. He reports that he does not use drugs. PHYSICAL EXAM     ED TRIAGE VITALS  BP: 133/79, Temp: 97.5 °F (36.4 °C), Pulse: 102, Resp: 18, SpO2: 97 %  Physical Exam  Vitals signs and nursing note reviewed. Constitutional:       General: He is not in acute distress. Appearance: Normal appearance. He is normal weight. He is not ill-appearing, toxic-appearing or diaphoretic. HENT:      Head: Normocephalic and atraumatic.       Right Ear: External ear normal. There is impacted cerumen. Left Ear: External ear normal. There is impacted cerumen. Nose: Congestion present. No rhinorrhea. Mouth/Throat:      Lips: Pink. No lesions. Mouth: Mucous membranes are moist.      Pharynx: Uvula midline. Posterior oropharyngeal erythema and uvula swelling present. No pharyngeal swelling or oropharyngeal exudate. Tonsils: No tonsillar exudate or tonsillar abscesses. Eyes:      Extraocular Movements: Extraocular movements intact. Conjunctiva/sclera: Conjunctivae normal.   Neck:      Musculoskeletal: Normal range of motion. Pulmonary:      Effort: Pulmonary effort is normal. No respiratory distress. Breath sounds: Normal breath sounds. No stridor. No wheezing, rhonchi or rales. Chest:      Chest wall: No tenderness. Musculoskeletal: Normal range of motion. Skin:     General: Skin is warm. Neurological:      General: No focal deficit present. Mental Status: He is alert and oriented to person, place, and time. Psychiatric:         Mood and Affect: Mood normal.         Behavior: Behavior normal.         Thought Content: Thought content normal.         Judgment: Judgment normal.         DIAGNOSTIC RESULTS   Labs:No results found for this visit on 04/06/21. IMAGING:  No orders to display     URGENT CARE COURSE:     Vitals:    04/06/21 1445   BP: 133/79   Pulse: 102   Resp: 18   Temp: 97.5 °F (36.4 °C)   TempSrc: Temporal   SpO2: 97%   Weight: 217 lb (98.4 kg)   Height: 5' 11\" (1.803 m)       Medications - No data to display  PROCEDURES:  None  FINAL IMPRESSION      1.  Allergic sinusitis        DISPOSITION/PLAN   Decision To Discharge    Drink lots of fluids  Take Motrin or Tylenol for headache  Humidification of air  Use nasal spray as directed  Take a nasal decongestant as directed  Monitor for any fever increased and sinus pain or pressure  Follow-up see her primary care provider in 48 hours  Or go to the emergency department for any changes or concerns.       PATIENT REFERRED TO:  Minus Young, APRN - CNP  [address]  [phone]    Schedule an appointment as soon as possible for a visit in 1 week  For re-check    DISCHARGE MEDICATIONS:  Discharge Medication List as of 4/6/2021  3:02 PM      START taking these medications    Details   brompheniramine-pseudoephedrine-DM (BROMFED DM) 2-30-10 MG/5ML syrup Take 5 mLs by mouth 4 times daily as needed for Congestion or Cough (headache), Disp-60 mL, R-0Normal      predniSONE (DELTASONE) 20 MG tablet Take 1 tablet by mouth daily for 5 days, Disp-5 tablet, R-0Normal           Discharge Medication List as of 4/6/2021  3:02 PM          SARI Mora CNP, APRN - CNP  04/06/21 1547

## 2021-05-14 ENCOUNTER — TELEPHONE (OUTPATIENT)
Dept: CARDIOLOGY CLINIC | Age: 51
End: 2021-05-14

## 2021-05-14 NOTE — LETTER
[address] [facility] Cardiology  [address] [address]  Phone: [phone]  Fax: [phone]      May 14, 2021     Kem[place] [address]      Dear Loney Duverney:    I am writing you because I have been informed of your missed appointment(s). We care about you and the management of your healthcare and want to make sure that you follow up as recommended. We're sorry you were unable to keep your appointment and hope that you are doing well. We would like to continue treating your healthcare needs. Please call the office to let us know your plans for treatment and to reschedule your appointment.      Sincerely,        Ashley Sauer MD

## 2021-07-02 ENCOUNTER — TELEPHONE (OUTPATIENT)
Dept: CARDIOLOGY CLINIC | Age: 51
End: 2021-07-02

## 2021-07-02 NOTE — TELEPHONE ENCOUNTER
PT DID NOT SHOW UP FOR HIS MEDTRON[employer] SINGLE ICD DEVICE CHECK IN OFFICE TODAY.  MILY WILL TAKE CARE OF

## 2021-07-09 ENCOUNTER — TELEPHONE (OUTPATIENT)
Dept: CARDIOLOGY CLINIC | Age: 51
End: 2021-07-09

## 2021-07-09 NOTE — LETTER
[address] [place] [address]  Phone: [phone]  Fax: [phone]        July 13, 2021    [place]em[place] [id number]      Dear David Lara: Erik Valenzuela I find it necessary to withdraw you from [facility] pacemaker/ICD clinic due to a breakdown in our relationship. It is essential that you continue to have your device interrogated. Therefore, I recommend you make immediate arrangements with another clinic to provide the needed follow up. For emergency medical services, please go to the nearest emergency room for treatment. If you wish, you have 30 days from today's date to schedule an appointment to have your device interrogated at our office. Enclosed is a form authorizing me to release a copy of your medical records to your new clinic. I will forward your records promptly upon receipt of this form, signed by you, with complete name and address of the clinic to receive your records. If you have any questions regarding the contents of this letter, you may reach us at the office during normal business hours.      Respectfully,       Dr Nir Brown

## 2021-07-09 NOTE — TELEPHONE ENCOUNTER
PT WAS  A NO SHOW FOR HIS DEVICE CHECK APPT IN OFFICE. MILY TO HANDLE THIS.  PT HAS COMPLIANCE ISSUES / MILY HAS A NOTE THAT IF HE NO SHOWS, HE IS TO BE DISMISSED

## 2021-07-09 NOTE — LETTER
[address] [facility] Cardiology  [place] [address] [place] [address]  Phone: [phone]  Fax: [phone]    Leta Thapa MD        July 9, 2021     [place]em[place] [id number]      Dear Ernesto Hughes: We care about you and the management of your healthcare and medication needs. We want to make sure you follow up as recommended. In order to properly treat you, it is important that your doctor sees you as scheduled. Per our office policy, because of your no show rate, we will not reschedule your appointment until we hear from you. Please call [phone] to make an appointment.        Sincerely,        Leta Thapa MD

## 2023-02-06 ENCOUNTER — OFFICE VISIT (OUTPATIENT)
Dept: FAMILY MEDICINE CLINIC | Age: 53
End: 2023-02-06
Payer: MEDICAID

## 2023-02-06 VITALS
WEIGHT: 197.6 LBS | TEMPERATURE: 97.7 F | DIASTOLIC BLOOD PRESSURE: 86 MMHG | HEIGHT: 71 IN | SYSTOLIC BLOOD PRESSURE: 134 MMHG | RESPIRATION RATE: 16 BRPM | BODY MASS INDEX: 27.66 KG/M2 | OXYGEN SATURATION: 99 % | HEART RATE: 99 BPM

## 2023-02-06 DIAGNOSIS — Z59.82 TRANSPORTATION INSECURITY: ICD-10-CM

## 2023-02-06 DIAGNOSIS — R09.81 CONGESTION OF NASAL SINUS: ICD-10-CM

## 2023-02-06 DIAGNOSIS — R05.1 ACUTE COUGH: Primary | ICD-10-CM

## 2023-02-06 DIAGNOSIS — K21.9 GASTROESOPHAGEAL REFLUX DISEASE, UNSPECIFIED WHETHER ESOPHAGITIS PRESENT: ICD-10-CM

## 2023-02-06 DIAGNOSIS — N52.9 VASCULOGENIC ERECTILE DYSFUNCTION, UNSPECIFIED VASCULOGENIC ERECTILE DYSFUNCTION TYPE: ICD-10-CM

## 2023-02-06 DIAGNOSIS — I25.10 CORONARY ARTERY DISEASE INVOLVING NATIVE HEART, UNSPECIFIED VESSEL OR LESION TYPE, UNSPECIFIED WHETHER ANGINA PRESENT: ICD-10-CM

## 2023-02-06 PROCEDURE — G8484 FLU IMMUNIZE NO ADMIN: HCPCS | Performed by: STUDENT IN AN ORGANIZED HEALTH CARE EDUCATION/TRAINING PROGRAM

## 2023-02-06 PROCEDURE — G8427 DOCREV CUR MEDS BY ELIG CLIN: HCPCS | Performed by: STUDENT IN AN ORGANIZED HEALTH CARE EDUCATION/TRAINING PROGRAM

## 2023-02-06 PROCEDURE — 3017F COLORECTAL CA SCREEN DOC REV: CPT | Performed by: STUDENT IN AN ORGANIZED HEALTH CARE EDUCATION/TRAINING PROGRAM

## 2023-02-06 PROCEDURE — G8419 CALC BMI OUT NRM PARAM NOF/U: HCPCS | Performed by: STUDENT IN AN ORGANIZED HEALTH CARE EDUCATION/TRAINING PROGRAM

## 2023-02-06 PROCEDURE — 99204 OFFICE O/P NEW MOD 45 MIN: CPT | Performed by: STUDENT IN AN ORGANIZED HEALTH CARE EDUCATION/TRAINING PROGRAM

## 2023-02-06 PROCEDURE — 4004F PT TOBACCO SCREEN RCVD TLK: CPT | Performed by: STUDENT IN AN ORGANIZED HEALTH CARE EDUCATION/TRAINING PROGRAM

## 2023-02-06 RX ORDER — ASPIRIN 81 MG/1
81 TABLET ORAL DAILY
Qty: 90 TABLET | Refills: 3 | Status: SHIPPED | OUTPATIENT
Start: 2023-02-06

## 2023-02-06 RX ORDER — OXYMETAZOLINE HYDROCHLORIDE 0.05 G/100ML
2 SPRAY NASAL NIGHTLY PRN
Qty: 15 ML | Refills: 0 | Status: SHIPPED | OUTPATIENT
Start: 2023-02-06 | End: 2024-02-06

## 2023-02-06 RX ORDER — PRAVASTATIN SODIUM 40 MG
40 TABLET ORAL DAILY
Qty: 90 TABLET | Refills: 3 | Status: CANCELLED | OUTPATIENT
Start: 2023-02-06

## 2023-02-06 RX ORDER — OMEPRAZOLE 40 MG/1
40 CAPSULE, DELAYED RELEASE ORAL DAILY
Qty: 30 CAPSULE | Refills: 3 | Status: SHIPPED | OUTPATIENT
Start: 2023-02-06

## 2023-02-06 RX ORDER — SILDENAFIL 50 MG/1
50 TABLET, FILM COATED ORAL PRN
Qty: 15 TABLET | Refills: 0 | Status: SHIPPED | OUTPATIENT
Start: 2023-02-06

## 2023-02-06 RX ORDER — ATORVASTATIN CALCIUM 40 MG/1
40 TABLET, FILM COATED ORAL DAILY
Qty: 30 TABLET | Refills: 3 | Status: SHIPPED | OUTPATIENT
Start: 2023-02-06

## 2023-02-06 RX ORDER — BENZONATATE 100 MG/1
100-200 CAPSULE ORAL 3 TIMES DAILY PRN
Qty: 60 CAPSULE | Refills: 0 | Status: SHIPPED | OUTPATIENT
Start: 2023-02-06 | End: 2023-02-16

## 2023-02-06 SDOH — ECONOMIC STABILITY - TRANSPORTATION SECURITY: TRANSPORTATION INSECURITY: Z59.82

## 2023-02-06 SDOH — ECONOMIC STABILITY: HOUSING INSECURITY
IN THE LAST 12 MONTHS, WAS THERE A TIME WHEN YOU DID NOT HAVE A STEADY PLACE TO SLEEP OR SLEPT IN A SHELTER (INCLUDING NOW)?: NO

## 2023-02-06 SDOH — ECONOMIC STABILITY: FOOD INSECURITY: WITHIN THE PAST 12 MONTHS, YOU WORRIED THAT YOUR FOOD WOULD RUN OUT BEFORE YOU GOT MONEY TO BUY MORE.: SOMETIMES TRUE

## 2023-02-06 SDOH — ECONOMIC STABILITY: FOOD INSECURITY: WITHIN THE PAST 12 MONTHS, THE FOOD YOU BOUGHT JUST DIDN'T LAST AND YOU DIDN'T HAVE MONEY TO GET MORE.: SOMETIMES TRUE

## 2023-02-06 SDOH — ECONOMIC STABILITY: INCOME INSECURITY: HOW HARD IS IT FOR YOU TO PAY FOR THE VERY BASICS LIKE FOOD, HOUSING, MEDICAL CARE, AND HEATING?: SOMEWHAT HARD

## 2023-02-06 ASSESSMENT — PATIENT HEALTH QUESTIONNAIRE - PHQ9
SUM OF ALL RESPONSES TO PHQ QUESTIONS 1-9: 1
2. FEELING DOWN, DEPRESSED OR HOPELESS: 1
SUM OF ALL RESPONSES TO PHQ9 QUESTIONS 1 & 2: 1
1. LITTLE INTEREST OR PLEASURE IN DOING THINGS: 0
SUM OF ALL RESPONSES TO PHQ QUESTIONS 1-9: 1

## 2023-02-06 ASSESSMENT — ENCOUNTER SYMPTOMS
GASTROINTESTINAL NEGATIVE: 1
SINUS PRESSURE: 1
RESPIRATORY NEGATIVE: 1

## 2023-02-06 NOTE — PROGRESS NOTES
S: 47 y.o. male with   Chief Complaint   Patient presents with    New Patient     Cough also Right eye matting and acid reflux       HPI: please see resident note for HPI and ROS. BP Readings from Last 3 Encounters:   02/06/23 134/86   04/06/21 133/79   10/16/20 (!) 150/91     Wt Readings from Last 3 Encounters:   02/06/23 197 lb 9.6 oz (89.6 kg)   04/06/21 217 lb (98.4 kg)   10/16/20 214 lb (97.1 kg)       O: VS:  height is 5' 11\" (1.803 m) and weight is 197 lb 9.6 oz (89.6 kg). His temporal temperature is 97.7 °F (36.5 °C). His blood pressure is 134/86 and his pulse is 99. His respiration is 16 and oxygen saturation is 99%. AAO/NAD, appropriate affect for mood  CV:  RRR, no murmur  Resp: CTAB       Diagnosis Orders   1. Acute cough  benzonatate (TESSALON) 100 MG capsule      2. Congestion of nasal sinus  oxymetazoline (12 HOUR NASAL SPRAY) 0.05 % nasal spray      3. Gastroesophageal reflux disease, unspecified whether esophagitis present  omeprazole (PRILOSEC) 40 MG delayed release capsule      4. Coronary artery disease involving native heart, unspecified vessel or lesion type, unspecified whether angina present  aspirin EC 81 MG EC tablet    atorvastatin (LIPITOR) 40 MG tablet      5. Vasculogenic erectile dysfunction, unspecified vasculogenic erectile dysfunction type  sildenafil (VIAGRA) 50 MG tablet      6. Transportation insecurity            Plan:  Please refer to resident note for full plan. 76-year-old male presents to the office to establish as a new patient. Patient is a pertinent past medical history of ischemic cardiomyopathy with coronary artery disease, type 2 diabetes, GERD, dyslipidemia. Patient has not been on medications for some time now secondary to not following up with cardiology or any provider. History of noncompliance with  medication regimen. Patient is also not interested in starting some medications back. We will plan to restart aspirin, statin, omeprazole.   Patient should also be on lisinopril, metoprolol, ranolazine per medication reconciliation but declines. No concerns for anginal events at this point. Would most likely benefit from cardiology reevaluation but also declines. Patient not currently on metformin for diabetes and most recent hemoglobin A1c was greater than 7%. We will plan to check blood work and follow-up in 1 month for continued medication adjustments. Patient does have a mild viral upper respiratory tract infection currently will prescribe combination Tessalon Perles, Afrin. Patient was educated on only using Afrin for 3 days max. Patient also has a history of rectal dysfunction most likely for coronary artery disease. Since he is not currently on nitrate therapy will give patient prescription for as needed erectile dysfunction management. Patient was educated on side effects associated with this. And symptoms look out for that require more emergent evaluation. Will most likely need to discontinue these in the near future once patient is back on chronic medication therapy. Follow-up in 1 month for blood work review and medication titration. Health Maintenance Due   Topic Date Due    COVID-19 Vaccine (1) Never done    Diabetic foot exam  Never done    HIV screen  Never done    Diabetic Alb to Cr ratio (uACR) test  Never done    Diabetic retinal exam  Never done    Hepatitis C screen  Never done    Hepatitis B vaccine (1 of 3 - Risk 3-dose series) Never done    Colorectal Cancer Screen  Never done    Lipids  10/22/2015    Shingles vaccine (1 of 2) Never done    A1C test (Diabetic or Prediabetic)  08/19/2019    GFR test (Diabetes, CKD 3-4, OR last GFR 15-59)  10/16/2021    Flu vaccine (1) 08/01/2022       Attending Physician Statement  I have discussed the case, including pertinent history and exam findings with the resident. I also have seen the patient and performed key portions of the examination.   I agree with the documented assessment and plan as documented by the resident.   DALTON Webb DO 2/7/2023 8:06 AM

## 2023-02-06 NOTE — PATIENT INSTRUCTIONS
Thank you   Thank you for trusting us with your healthcare needs. You may receive a survey regarding today's visit. It would help us out if you would take a few moments to provide your feedback. We value your input. Please bring in ALL medications BOTTLES, including inhalers, herbal supplements, over the counter, prescribed & non-prescribed medicine. The office would like actual medication bottles and a list.   Please note our OFFICE POLICIES:   Prior to getting your labs drawn, please check with your insurance company for benefits and eligibility of lab services. Often, insurance companies cover certain tests for preventative visits only. It is patient's responsibility to see what is covered. We are unable to change a diagnosis after the test has been performed. Lab orders will not be re-printed. Please hold onto your original lab orders and take them to your lab to be completed. If you no show your scheduled appointment three times, you will be dismissed from this practice. Reschedules must be completed 24 hours prior to your schedule appointment. If the list below has been completed, PLEASE FAX RECORDS TO OUR OFFICE @ [phone].  Once the records have been received we will update your records at our office:  Health Maintenance Due   Topic Date Due    COVID-19 Vaccine (1) Never done    Diabetic foot exam  Never done    Depression Screen  Never done    HIV screen  Never done    Diabetic Alb to Cr ratio (uACR) test  Never done    Diabetic retinal exam  Never done    Hepatitis C screen  Never done    Hepatitis B vaccine (1 of 3 - Risk 3-dose series) Never done    Colorectal Cancer Screen  Never done    Lipids  10/22/2015    Shingles vaccine (1 of 2) Never done    A1C test (Diabetic or Prediabetic)  08/19/2019    GFR test (Diabetes, CKD 3-4, OR last GFR 15-59)  10/16/2021    Flu vaccine (1) 08/01/2022

## 2023-02-06 NOTE — PROGRESS NOTES
[address] [facility] [place] [address] [facility] [address]  Dept: [phone]  Dept Fax: [phone]: [phone]      Assessment & Plan   1. Acute cough  Likely postviral cough. - benzonatate (TESSALON) 100 MG capsule; Take 1-2 capsules by mouth 3 times daily as needed for Cough  Dispense: 60 capsule; Refill: 0    2. Congestion of nasal sinus  Likely secondary to URI. Advised patient not to use oxymetazoline for more than 3 days due to risk of rebound congestion.  - oxymetazoline (12 HOUR NASAL SPRAY) 0.05 % nasal spray; 2 sprays by Nasal route nightly as needed for Congestion (do not use for more than 3 days)  Dispense: 15 mL; Refill: 0    3. Gastroesophageal reflux disease, unspecified whether esophagitis present  Chronic medication refilled. - omeprazole (PRILOSEC) 40 MG delayed release capsule; Take 1 capsule by mouth daily  Dispense: 30 capsule; Refill: 3    4. Coronary artery disease involving native heart, unspecified vessel or lesion type, unspecified whether angina present  Patient has been off of all medications for the past 2 years. We will restart certain medications at this time, have patient come back in a month, and continue discussion on restarting the other medications. - aspirin EC 81 MG EC tablet; Take 1 tablet by mouth daily  Dispense: 90 tablet; Refill: 3  - atorvastatin (LIPITOR) 40 MG tablet; Take 1 tablet by mouth daily  Dispense: 30 tablet; Refill: 3  -Advised patient to reach out to his previous cardiologist whether he can return to clinic or if they can add recommend another cardiologist for pacemaker clinic. 5. Vasculogenic erectile dysfunction, unspecified vasculogenic erectile dysfunction type  Not currently using nitrates or Ranexa. Will prescribe a short course of Viagra for patient. But we will have to reconsider this if restarting patient on nitrates in the future.   - sildenafil (VIAGRA) 50 MG tablet; Take 1 tablet by mouth as needed for Erectile Dysfunction  Dispense: 15 tablet; Refill: 0    6. Transportation insecurity  Patient does not have transportation available. Patient will need rides from [facility] in the future. Patient Instructions   Thank you   Thank you for trusting us with your healthcare needs. You may receive a survey regarding today's visit. It would help us out if you would take a few moments to provide your feedback. We value your input. Please bring in ALL medications BOTTLES, including inhalers, herbal supplements, over the counter, prescribed & non-prescribed medicine. The office would like actual medication bottles and a list.   Please note our OFFICE POLICIES:   Prior to getting your labs drawn, please check with your insurance company for benefits and eligibility of lab services. Often, insurance companies cover certain tests for preventative visits only. It is patient's responsibility to see what is covered. We are unable to change a diagnosis after the test has been performed. Lab orders will not be re-printed. Please hold onto your original lab orders and take them to your lab to be completed. If you no show your scheduled appointment three times, you will be dismissed from this practice. Reschedules must be completed 24 hours prior to your schedule appointment. If the list below has been completed, PLEASE FAX RECORDS TO OUR OFFICE @ [phone].  Once the records have been received we will update your records at our office:  Health Maintenance Due   Topic Date Due    COVID-19 Vaccine (1) Never done    Diabetic foot exam  Never done    Depression Screen  Never done    HIV screen  Never done    Diabetic Alb to Cr ratio (uACR) test  Never done    Diabetic retinal exam  Never done    Hepatitis C screen  Never done    Hepatitis B vaccine (1 of 3 - Risk 3-dose series) Never done    Colorectal Cancer Screen  Never done    Lipids  10/22/2015    Shingles vaccine (1 of 2) Never done    A1C test (Diabetic or Prediabetic)  08/19/2019    GFR test (Diabetes, CKD 3-4, OR last GFR 15-59)  10/16/2021    Flu vaccine (1) 08/01/2022               Return in about 1 month (around 3/6/2023) for preventative visit; please make the appt after 3 pm, preferably after 4pm. .  Patient will obtain updated labs at this time. Future Appointments   Date Time Provider Nikole Robertson   3/6/2023  4:00 PM Reginaldo Dougherty  [address]       History of Present Illness   Reason for Appointment:   Chief Complaint   Patient presents with    New Patient     Cough also Right eye matting and acid reflux     Nevin Race is a 47 y.o. male who presents today for:    New patient. Has hx of repeated cancellations and no-shows to cardiology and pacemaker clinic. Dismissed from pacemaker clinic in 2021, has 3 prior dismissals from same clinic due to no shows. No PCP listed in chart. No notes of any health care recently. Reports that he has not taken any of his medications in the past 2 years. Has hx of cad s/p RCA stent in 2012  Ischemic cardiomyopathy w/ EF 40%  Type 2 DM  HLD    Nicotine and marijuana use  Hx of noncompliance. Cold - productive cough. Acute issue.  - about a week  - right paranasal sinus has a little bit of pain  - had a sore throat initially  - no fevers    GERD - long standing    Does not have reliable transportation. Walked over to the appointment and will have [address] give him a ride back. Review of Systems   Constitutional:  Negative for chills and fever. HENT:  Positive for congestion and sinus pressure. Negative for ear pain. Respiratory: Negative. Cardiovascular: Negative. Gastrointestinal: Negative.        [facility]    Patient Active Problem List    Diagnosis Date Noted    Anterior chest wall  at site of AICD 01/13/2018     Priority: High     Class: Chronic    Transportation insecurity 02/06/2023     Priority: Medium    Gastroesophageal reflux disease 02/06/2023     Priority: Medium    Vasculogenic erectile dysfunction 02/06/2023     Priority: Medium    SOB (shortness of breath) on exertion 12/13/2019    Noncompliance 12/13/2019    Nicotine abuse 08/20/2018    History of asthma 08/20/2018    Hx of gastroesophageal reflux (GERD) 08/20/2018    Marijuana use 08/20/2018    Chronic chest pain due to ??? 02/13/2018    S/P cardiac cath 03/2017 06/26/2017    Stable angina ([facility] [address] 06/26/2017    Ischemic cardiomyopathy EF 35%- later EF 50%- EF now 40% 10/24/2014    S/P ICD (internal cardiac defibrillator) procedure: 10/24/2014: Medtronic Single Chamber 10/24/2014     10/24/2014: Medtronic Single Chamber. Dr. Marky Lucero      Type 2 diabetes mellitus with hyperglycemia, without long-term current use of insulin ([facility] [address] 10/21/2014    Dyslipidemia 10/21/2014    CAD (coronary artery disease), S/P stent to RCA=- 2012        [address] [facility] [place]        Procedure Laterality Date    CORONARY ANGIOPLASTY WITH STENT PLACEMENT  2002?    x2 @ [facility] Right 2017    R eye cyst removal    CYST REMOVAL Right 06/21/2017    eye    ELBOW SURGERY Left     PACEMAKER PLACEMENT      TOE AMPUTATION         Meds    Prior to Admission medications    Medication Sig Start Date End Date Taking?  Authorizing Provider   omeprazole (PRILOSEC) 40 MG delayed release capsule Take 1 capsule by mouth daily 2/6/23  Yes Alex Moreno MD   oxymetazoline (12 HOUR NASAL SPRAY) 0.05 % nasal spray 2 sprays by Nasal route nightly as needed for Congestion (do not use for more than 3 days) 2/6/23 2/6/24 Yes Alex Moreno MD   benzonatate (TESSALON) 100 MG capsule Take 1-2 capsules by mouth 3 times daily as needed for Cough 2/6/23 2/16/23 Yes Alex Moreno MD   aspirin EC 81 MG EC tablet Take 1 tablet by mouth daily 2/6/23  Yes Alex Moreno MD   sildenafil (VIAGRA) 50 MG tablet Take 1 tablet by mouth as needed for Erectile Dysfunction 2/6/23  Yes Alex Moreno MD   atorvastatin (LIPITOR) 40 MG tablet Take 1 tablet by mouth daily 2/6/23 Yes Drake Higgins MD   ibuprofen (ADVIL;MOTRIN) 600 MG tablet Take 1 tablet by mouth every 6 hours as needed for Pain 1/30/20  Yes Stephon Sandoval PA-C   pravastatin (PRAVACHOL) 40 MG tablet Take 1 tablet by mouth daily 1/28/20  Yes Nicole Marlow MD   metoprolol tartrate (LOPRESSOR) 50 MG tablet Take 1.5 tablets by mouth 2 times daily 1/28/20  Yes Timbo Ceballos PA-C   ranolazine (RANEXA) 1000 MG extended release tablet Take 1 tablet by mouth 2 times daily 12/13/19  Yes Nicole Marlow MD   fluticasone (FLONASE) 50 MCG/ACT nasal spray 2 sprays by Nasal route daily Apply daily to each nare 4/16/19  Yes SARI Burton CNP   lisinopril (PRINIVIL;ZESTRIL) 5 MG tablet Take 1 tablet by mouth daily 8/21/18  Yes Femi Dykes MD   albuterol sulfate  (90 BASE) MCG/ACT inhaler Inhale 2 puffs into the lungs every 6 hours as needed for Wheezing   Yes Historical Provider, MD   metFORMIN (GLUCOPHAGE) 500 MG tablet Take 500 mg by mouth 2 times daily (with meals)   Yes Historical Provider, MD   traZODone (DESYREL) 150 MG tablet Take 150 mg by mouth nightly   Yes Historical Provider, MD   Tiotropium Bromide Monohydrate (SPIRIVA HANDIHALER IN) Inhale into the lungs every morning    Yes Historical Provider, MD   citalopram (CELEXA) 20 MG tablet Take 20 mg by mouth daily. Yes Historical Provider, MD        Allergies    Patient has no known allergies. Social    Social History     Tobacco Use    Smoking status: Every Day     Packs/day: 0.50     Years: 29.00     Pack years: 14.50     Types: Cigarettes    Smokeless tobacco: Never   Vaping Use    Vaping Use: Never used   Substance Use Topics    Alcohol use: Yes     Comment: social - 2 times a month    Drug use: No     Comment: Hx of marijuana       Physical Exam     Vitals:    02/06/23 1530   BP: 134/86   Pulse: 99   Resp: 16   Temp: 97.7 °F (36.5 °C)   SpO2: 99%       Physical Exam:  GENERAL: No acute distress. Alert and conversant. EYES: Normal conjunctiva. Anicteric. Round symmetric pupils. ENT: No nasal discharge. Hearing grossly intact. NECK: Supple. No masses or thyromegaly. HEART: Normal S1/S2. No murmurs. No edema. LUNGS: Respirations non-labored. Clear to auscultation. MSK: Normal ambulation. No cyanosis. NEUROLOGICAL: Grossly normal.  PSYCH: Cooperative. Appropriate mood and affect.   SKIN: No rashes    Electronically signed by Melissa Sutton MD on 2/6/2023 at 6:25 PM

## 2023-02-06 NOTE — PROGRESS NOTES
Health Maintenance Due   Topic Date Due    COVID-19 Vaccine (1) Never done    Diabetic foot exam  Never done    Depression Screen  Never done    HIV screen  Never done    Diabetic Alb to Cr ratio (uACR) test  Never done    Diabetic retinal exam  Never done    Hepatitis C screen  Never done    Hepatitis B vaccine (1 of 3 - Risk 3-dose series) Never done    Colorectal Cancer Screen  Never done    Lipids  10/22/2015    Shingles vaccine (1 of 2) Never done    A1C test (Diabetic or Prediabetic)  08/19/2019    GFR test (Diabetes, CKD 3-4, OR last GFR 15-59)  10/16/2021    Flu vaccine (1) 08/01/2022

## 2023-03-12 NOTE — PROGRESS NOTES
[address] [facility] [place] [facility] [address] [facility] [address]  Dept: [phone]  Dept Fax: [phone]: [phone]      Assessment & Plan     1. Viral URI  2. Fever, unspecified fever cause  3. Subacute cough  Unclear whether this is second URI or worsening of URI from February. Will obtain CXR to assess. Will also treat symptomatically. - CMP to assess renal and liver function given tylenol and potential ibuprofen use  - CBC with Auto Differential; Future  - acetaminophen (TYLENOL) 500 MG tablet; Take 2 tablets by mouth 3 times daily as needed for Pain or Fever  Dispense: 60 tablet; Refill: 0  - benzonatate (TESSALON) 200 MG capsule; Take 1 capsule by mouth 3 times daily as needed for Cough  Dispense: 30 capsule; Refill: 0  - XR CHEST STANDARD (2 VW); Future    4. Other fatigue  - Vitamin B12 & Folate; Future     Cont asa, statin. Patient needs to reach out to cardiology for appt. F/u in 1 month and restart acei vs bb  Consider repeat echo in future? Patient Instructions   Thank you   Thank you for trusting us with your healthcare needs. You may receive a survey regarding today's visit. It would help us out if you would take a few moments to provide your feedback. We value your input. Please bring in ALL medications BOTTLES, including inhalers, herbal supplements, over the counter, prescribed & non-prescribed medicine. The office would like actual medication bottles and a list.   Please note our OFFICE POLICIES:   Prior to getting your labs drawn, please check with your insurance company for benefits and eligibility of lab services. Often, insurance companies cover certain tests for preventative visits only. It is patient's responsibility to see what is covered. We are unable to change a diagnosis after the test has been performed. Lab orders will not be re-printed.  Please hold onto your original lab orders and take them to your lab to be completed. If you no show your scheduled appointment three times, you will be dismissed from this practice. Reschedules must be completed 24 hours prior to your schedule appointment. If the list below has been completed, PLEASE FAX RECORDS TO OUR OFFICE @ [phone]. Once the records have been received we will update your records at our office:  Health Maintenance Due   Topic Date Due    COVID-19 Vaccine (1) Never done    Diabetic foot exam  Never done    HIV screen  Never done    Diabetic Alb to Cr ratio (uACR) test  Never done    Diabetic retinal exam  Never done    Hepatitis C screen  Never done    Hepatitis B vaccine (1 of 3 - Risk 3-dose series) Never done    Colorectal Cancer Screen  Never done    Lipids  10/22/2015    Shingles vaccine (1 of 2) Never done    A1C test (Diabetic or Prediabetic)  08/19/2019    GFR test (Diabetes, CKD 3-4, OR last GFR 15-59)  10/16/2021    Flu vaccine (1) 08/01/2022               Return in about 1 month (around 4/13/2023) for follow up for chronic conditions, restart BB/AceI. Patient will obtain updated labs at this time. Future Appointments   Date Time Provider Nikole Robertson   4/24/2023  4:00 PM Dolores Phillips  [address]         History of Present Illness   Reason for Appointment:   Chief Complaint   Patient presents with    1 Month Follow-Up     Also needing Doctor slip for work today due to cough, runny nose, and body aches also Right side neck pain       Virginia Miller is a 47 y.o. male who presents today for:    Has URI that worsened last Thurs. Reports that he didn't really get all that better since seeing us in Feb for URI symptoms. Have been having fevers and used some ibuprofen, which helped. Also reports aches, rhinorrhea, and cough. Has hx of repeated cancellations and no-shows to cardiology and pacemaker clinic. Dismissed from pacemaker clinic in 2021, has 3 prior dismissals from same clinic due to no shows.      Reports that he has not taken any of his medications in the past 2 years. Does not have reliable transportation. Walked over to the appointment and will have [facility] give him a ride back. Has hx of cad s/p RCA stent in 2012 - patient not using nitrates or ranexa. Restart asa, statin,   Ischemic cardiomyopathy w/ EF 40% - patient needs to reach out to cards for referral to other pacemaker clinic. Plan to start Acei, lopressor. Type 2 DM  HLD on statin  Gerd - on ppi  ED - on viagra    Nicotine and marijuana use  Hx of noncompliance. Review of Systems   Constitutional:  Positive for chills, fatigue and fever. HENT:  Positive for congestion and rhinorrhea. Eyes: Negative. Respiratory:  Positive for cough. Cardiovascular: Negative. Gastrointestinal: Negative. PMH    Patient Active Problem List    Diagnosis Date Noted    Anterior chest wall  at site of AICD 01/13/2018     Priority: High     Class: Chronic    Transportation insecurity 02/06/2023     Priority: Medium    Gastroesophageal reflux disease 02/06/2023     Priority: Medium    Vasculogenic erectile dysfunction 02/06/2023     Priority: Medium    SOB (shortness of breath) on exertion 12/13/2019    Noncompliance 12/13/2019    Nicotine abuse 08/20/2018    History of asthma 08/20/2018    Hx of gastroesophageal reflux (GERD) 08/20/2018    Marijuana use 08/20/2018    Chronic chest pain due to ??? 02/13/2018    S/P cardiac cath 03/2017 06/26/2017    Stable angina ([facility] [address] 06/26/2017    Ischemic cardiomyopathy EF 35%- later EF 50%- EF now 40% 10/24/2014    S/P ICD (internal cardiac defibrillator) procedure: 10/24/2014: Medtronic Single Chamber 10/24/2014     10/24/2014: Medtronic Single Chamber.   Dr. Sedrick Hernandez      Type 2 diabetes mellitus with hyperglycemia, without long-term current use of insulin ([facility] [address] 10/21/2014    Dyslipidemia 10/21/2014    CAD (coronary artery disease), S/P stent to RCA=- 2012        [address]        Procedure Laterality Date    CORONARY ANGIOPLASTY WITH STENT PLACEMENT  2002?    x2 @ [facility] Right 2017    R eye cyst removal    CYST REMOVAL Right 06/21/2017    eye    ELBOW SURGERY Left     PACEMAKER PLACEMENT      TOE AMPUTATION         Meds    Prior to Admission medications    Medication Sig Start Date End Date Taking?  Authorizing Provider   nitroGLYCERIN (NITROSTAT) 0.4 MG SL tablet nitroglycerin   Yes Historical Provider, MD   benzonatate (TESSALON) 200 MG capsule Take 1 capsule by mouth 3 times daily as needed for Cough 3/13/23 3/23/23 Yes Giovanna Funes MD   acetaminophen (TYLENOL) 500 MG tablet Take 2 tablets by mouth 3 times daily as needed for Pain or Fever 3/13/23  Yes Giovanna Funes MD   omeprazole (PRILOSEC) 40 MG delayed release capsule Take 1 capsule by mouth daily 2/6/23  Yes Giovanna Funes MD   oxymetazoline (12 HOUR NASAL SPRAY) 0.05 % nasal spray 2 sprays by Nasal route nightly as needed for Congestion (do not use for more than 3 days) 2/6/23 2/6/24 Yes Giovanna Funes MD   aspirin EC 81 MG EC tablet Take 1 tablet by mouth daily 2/6/23  Yes Giovanna Funes MD   atorvastatin (LIPITOR) 40 MG tablet Take 1 tablet by mouth daily 2/6/23  Yes Giovanna Funes MD   ibuprofen (ADVIL;MOTRIN) 600 MG tablet Take 1 tablet by mouth every 6 hours as needed for Pain 1/30/20  Yes Víctor Zhang PA-C   metoprolol tartrate (LOPRESSOR) 50 MG tablet Take 1.5 tablets by mouth 2 times daily 1/28/20  Yes Cookie Martinez PA-C   ranolazine (RANEXA) 1000 MG extended release tablet Take 1 tablet by mouth 2 times daily 12/13/19  Yes Gisella Pan MD   fluticasone (FLONASE) 50 MCG/ACT nasal spray 2 sprays by Nasal route daily Apply daily to each nare 4/16/19  Yes SARI Allen CNP   lisinopril (PRINIVIL;ZESTRIL) 5 MG tablet Take 1 tablet by mouth daily 8/21/18  Yes Orlin Vasques MD   albuterol sulfate  (90 BASE) MCG/ACT inhaler Inhale 2 puffs into the lungs every 6 hours as needed for Wheezing   Yes Historical Provider, MD   metFORMIN (GLUCOPHAGE) 500 MG tablet Take 500 mg by mouth 2 times daily (with meals)   Yes Historical Provider, MD   traZODone (DESYREL) 150 MG tablet Take 150 mg by mouth nightly   Yes Historical Provider, MD   Tiotropium Bromide Monohydrate (SPIRIVA HANDIHALER IN) Inhale into the lungs every morning    Yes Historical Provider, MD   citalopram (CELEXA) 20 MG tablet Take 20 mg by mouth daily. Yes Historical Provider, MD   sildenafil (VIAGRA) 50 MG tablet Take 1 tablet by mouth as needed for Erectile Dysfunction  Patient not taking: Reported on 3/13/2023 2/6/23   Shana Singleton MD        Allergies    Patient has no known allergies. Social    Social History     Tobacco Use    Smoking status: Every Day     Packs/day: 0.50     Years: 29.00     Pack years: 14.50     Types: Cigarettes    Smokeless tobacco: Never   Vaping Use    Vaping Use: Never used   Substance Use Topics    Alcohol use: Yes     Comment: social - 2 times a month    Drug use: No     Comment: Hx of marijuana       Physical Exam     Vitals:    03/13/23 1523   BP: 122/76   Pulse: (!) 104   Resp: 16   Temp: 97.3 °F (36.3 °C)   SpO2: 98%         Physical Exam:  GENERAL: No acute distress. Alert and conversant. EYES: Normal conjunctiva. Anicteric. Round symmetric pupils. ENT: No nasal discharge. Hearing grossly intact. NECK: Supple. No masses or thyromegaly. HEART: Normal S1/S2. No murmurs. No edema. LUNGS: Respirations non-labored. Clear to auscultation. MSK: Normal ambulation. No cyanosis. NEUROLOGICAL: Grossly normal.  PSYCH: Cooperative. Appropriate mood and affect.   SKIN: No rashes    Electronically signed by Shana Singleton MD on 3/13/2023 at 7:46 PM

## 2023-03-13 ENCOUNTER — HOSPITAL ENCOUNTER (OUTPATIENT)
Age: 53
Discharge: HOME | End: 2023-03-13
Payer: MEDICAID

## 2023-03-13 ENCOUNTER — OFFICE VISIT (OUTPATIENT)
Dept: FAMILY MEDICINE CLINIC | Age: 53
End: 2023-03-13
Payer: MEDICAID

## 2023-03-13 ENCOUNTER — HOSPITAL ENCOUNTER (OUTPATIENT)
Dept: GENERAL RADIOLOGY | Age: 53
Discharge: HOME | End: 2023-03-13
Payer: MEDICAID

## 2023-03-13 VITALS
RESPIRATION RATE: 16 BRPM | HEIGHT: 71 IN | DIASTOLIC BLOOD PRESSURE: 76 MMHG | SYSTOLIC BLOOD PRESSURE: 122 MMHG | TEMPERATURE: 97.3 F | HEART RATE: 104 BPM | WEIGHT: 200.4 LBS | BODY MASS INDEX: 28.06 KG/M2 | OXYGEN SATURATION: 98 %

## 2023-03-13 DIAGNOSIS — J06.9 VIRAL URI: Primary | ICD-10-CM

## 2023-03-13 DIAGNOSIS — R05.2 SUBACUTE COUGH: ICD-10-CM

## 2023-03-13 DIAGNOSIS — R53.83 OTHER FATIGUE: ICD-10-CM

## 2023-03-13 DIAGNOSIS — R50.9 FEVER, UNSPECIFIED FEVER CAUSE: ICD-10-CM

## 2023-03-13 DIAGNOSIS — J06.9 VIRAL URI: ICD-10-CM

## 2023-03-13 LAB
ALBUMIN SERPL BCG-MCNC: 4.6 G/DL (ref 3.5–5.1)
ALP SERPL-CCNC: 95 U/L (ref 38–126)
ALT SERPL W/O P-5'-P-CCNC: 34 U/L (ref 11–66)
ANION GAP SERPL CALC-SCNC: 15 MEQ/L (ref 8–16)
AST SERPL-CCNC: 23 U/L (ref 5–40)
BASOPHILS ABSOLUTE: 0.1 THOU/MM3 (ref 0–0.1)
BASOPHILS NFR BLD AUTO: 0.9 %
BILIRUB SERPL-MCNC: 0.4 MG/DL (ref 0.3–1.2)
BUN SERPL-MCNC: 7 MG/DL (ref 7–22)
CALCIUM SERPL-MCNC: 9.6 MG/DL (ref 8.5–10.5)
CHLORIDE SERPL-SCNC: 100 MEQ/L (ref 98–111)
CO2 SERPL-SCNC: 26 MEQ/L (ref 23–33)
CREAT SERPL-MCNC: 1 MG/DL (ref 0.4–1.2)
DEPRECATED RDW RBC AUTO: 45.1 FL (ref 35–45)
EOSINOPHIL NFR BLD AUTO: 5 %
EOSINOPHILS ABSOLUTE: 0.3 THOU/MM3 (ref 0–0.4)
ERYTHROCYTE [DISTWIDTH] IN BLOOD BY AUTOMATED COUNT: 13.9 % (ref 11.5–14.5)
GFR SERPL CREATININE-BSD FRML MDRD: > 60 ML/MIN/1.73M2
GLUCOSE SERPL-MCNC: 202 MG/DL (ref 70–108)
HCT VFR BLD AUTO: 46.3 % (ref 42–52)
HGB BLD-MCNC: 15.8 GM/DL (ref 14–18)
IMM GRANULOCYTES # BLD AUTO: 0.02 THOU/MM3 (ref 0–0.07)
IMM GRANULOCYTES NFR BLD AUTO: 0.3 %
LYMPHOCYTES ABSOLUTE: 2.5 THOU/MM3 (ref 1–4.8)
LYMPHOCYTES NFR BLD AUTO: 38.6 %
MCH RBC QN AUTO: 30.3 PG (ref 26–33)
MCHC RBC AUTO-ENTMCNC: 34.1 GM/DL (ref 32.2–35.5)
MCV RBC AUTO: 88.9 FL (ref 80–94)
MONOCYTES ABSOLUTE: 1 THOU/MM3 (ref 0.4–1.3)
MONOCYTES NFR BLD AUTO: 15.1 %
NEUTROPHILS NFR BLD AUTO: 40.1 %
NRBC BLD AUTO-RTO: 0 /100 WBC
PLATELET # BLD AUTO: 181 THOU/MM3 (ref 130–400)
PMV BLD AUTO: 10.4 FL (ref 9.4–12.4)
POTASSIUM SERPL-SCNC: 4.5 MEQ/L (ref 3.5–5.2)
PROT SERPL-MCNC: 7.6 G/DL (ref 6.1–8)
RBC # BLD AUTO: 5.21 MILL/MM3 (ref 4.7–6.1)
SEGMENTED NEUTROPHILS ABSOLUTE COUNT: 2.6 THOU/MM3 (ref 1.8–7.7)
SODIUM SERPL-SCNC: 141 MEQ/L (ref 135–145)
WBC # BLD AUTO: 6.4 THOU/MM3 (ref 4.8–10.8)

## 2023-03-13 PROCEDURE — G8427 DOCREV CUR MEDS BY ELIG CLIN: HCPCS | Performed by: STUDENT IN AN ORGANIZED HEALTH CARE EDUCATION/TRAINING PROGRAM

## 2023-03-13 PROCEDURE — 36415 COLL VENOUS BLD VENIPUNCTURE: CPT

## 2023-03-13 PROCEDURE — 99213 OFFICE O/P EST LOW 20 MIN: CPT | Performed by: STUDENT IN AN ORGANIZED HEALTH CARE EDUCATION/TRAINING PROGRAM

## 2023-03-13 PROCEDURE — 80053 COMPREHEN METABOLIC PANEL: CPT

## 2023-03-13 PROCEDURE — 85025 COMPLETE CBC W/AUTO DIFF WBC: CPT

## 2023-03-13 PROCEDURE — G8484 FLU IMMUNIZE NO ADMIN: HCPCS | Performed by: STUDENT IN AN ORGANIZED HEALTH CARE EDUCATION/TRAINING PROGRAM

## 2023-03-13 PROCEDURE — 4004F PT TOBACCO SCREEN RCVD TLK: CPT | Performed by: STUDENT IN AN ORGANIZED HEALTH CARE EDUCATION/TRAINING PROGRAM

## 2023-03-13 PROCEDURE — 82746 ASSAY OF FOLIC ACID SERUM: CPT

## 2023-03-13 PROCEDURE — 82607 VITAMIN B-12: CPT

## 2023-03-13 PROCEDURE — 3017F COLORECTAL CA SCREEN DOC REV: CPT | Performed by: STUDENT IN AN ORGANIZED HEALTH CARE EDUCATION/TRAINING PROGRAM

## 2023-03-13 PROCEDURE — 71046 X-RAY EXAM CHEST 2 VIEWS: CPT

## 2023-03-13 PROCEDURE — G8419 CALC BMI OUT NRM PARAM NOF/U: HCPCS | Performed by: STUDENT IN AN ORGANIZED HEALTH CARE EDUCATION/TRAINING PROGRAM

## 2023-03-13 RX ORDER — ACETAMINOPHEN 500 MG
1000 TABLET ORAL 3 TIMES DAILY PRN
Qty: 60 TABLET | Refills: 0 | Status: SHIPPED | OUTPATIENT
Start: 2023-03-13

## 2023-03-13 RX ORDER — NITROGLYCERIN 0.4 MG/1
TABLET SUBLINGUAL
COMMUNITY

## 2023-03-13 RX ORDER — BENZONATATE 200 MG/1
200 CAPSULE ORAL 3 TIMES DAILY PRN
Qty: 30 CAPSULE | Refills: 0 | Status: SHIPPED | OUTPATIENT
Start: 2023-03-13 | End: 2023-03-23

## 2023-03-13 ASSESSMENT — ENCOUNTER SYMPTOMS
GASTROINTESTINAL NEGATIVE: 1
RHINORRHEA: 1
COUGH: 1
EYES NEGATIVE: 1

## 2023-03-13 NOTE — PROGRESS NOTES
S: 47 y.o. male with   Chief Complaint   Patient presents with    1 Month Follow-Up     Also needing Doctor slip for work today due to cough, runny nose, and body aches also Right side neck pain       HPI: please see resident note for HPI and ROS. BP Readings from Last 3 Encounters:   03/13/23 122/76   02/06/23 134/86   04/06/21 133/79     Wt Readings from Last 3 Encounters:   03/13/23 200 lb 6.4 oz (90.9 kg)   02/06/23 197 lb 9.6 oz (89.6 kg)   04/06/21 217 lb (98.4 kg)       O: VS:  height is 5' 11\" (1.803 m) and weight is 200 lb 6.4 oz (90.9 kg). His temporal temperature is 97.3 °F (36.3 °C). His blood pressure is 122/76 and his pulse is 104 (abnormal). His respiration is 16 and oxygen saturation is 98%. Physical exam performed by resident physician. Diagnosis Orders   1. Viral URI  Comprehensive Metabolic Panel    CBC with Auto Differential      2. Fever, unspecified fever cause  Comprehensive Metabolic Panel    CBC with Auto Differential    acetaminophen (TYLENOL) 500 MG tablet      3. Subacute cough  Comprehensive Metabolic Panel    CBC with Auto Differential    benzonatate (TESSALON) 200 MG capsule    XR CHEST STANDARD (2 VW)      4. Other fatigue  Vitamin B12 & Folate          Plan:  Please refer to resident note for full plan. 78-year-old male presents the office for acute concern of upper respiratory tract infection. Patient had a multiple day history of runny nose, congestion, cough, body aches consistent with viral upper respiratory tract infection. No concern for acute bacterial infection. We will plan to treat with Tessalon Perles, Tylenol as needed for fever/body aches. We will plan to obtain chest x-ray to rule out underlying pathology of cough. Patient also has multiple chronic medical conditions that will need to be reviewed. Patient has a history of noncompliance and not following up closely.   Patient was educated importance of following up the next 2 to 4 weeks for chronic condition review, blood work, medication reevaluation. Patient verbalized understanding of plan and is agreeable. Health Maintenance Due   Topic Date Due    COVID-19 Vaccine (1) Never done    Diabetic foot exam  Never done    HIV screen  Never done    Diabetic Alb to Cr ratio (uACR) test  Never done    Diabetic retinal exam  Never done    Hepatitis C screen  Never done    Hepatitis B vaccine (1 of 3 - Risk 3-dose series) Never done    Colorectal Cancer Screen  Never done    Lipids  10/22/2015    Shingles vaccine (1 of 2) Never done    A1C test (Diabetic or Prediabetic)  08/19/2019    Flu vaccine (1) 08/01/2022       Attending Physician Statement  I have discussed the case, including pertinent history and exam findings with the resident. I agree with the documented assessment and plan as documented by the resident.   KARIE Jackman DO 3/14/2023 7:39 AM

## 2023-03-13 NOTE — LETTER
March 13, 2023       Sj Lynn YOB: 1970   [address] [facility] Date of Visit:  3/13/2023       To Whom It May Concern: It is my medical opinion that Iain Aus be excused from work today. If you have any questions or concerns, please don't hesitate to call.     Sincerely,        Tyree Figueroa MD

## 2023-03-13 NOTE — PROGRESS NOTES
Health Maintenance Due   Topic Date Due    COVID-19 Vaccine (1) Never done    Diabetic foot exam  Never done    HIV screen  Never done    Diabetic Alb to Cr ratio (uACR) test  Never done    Diabetic retinal exam  Never done    Hepatitis C screen  Never done    Hepatitis B vaccine (1 of 3 - Risk 3-dose series) Never done    Colorectal Cancer Screen  Never done    Lipids  10/22/2015    Shingles vaccine (1 of 2) Never done    A1C test (Diabetic or Prediabetic)  08/19/2019    GFR test (Diabetes, CKD 3-4, OR last GFR 15-59)  10/16/2021    Flu vaccine (1) 08/01/2022

## 2023-03-13 NOTE — PROGRESS NOTES
[address] [facility] [place] [address] [address]  Dept: [phone]  Loc: [phone]      Please see Resident note for complete HPI. Here for acute concern of URI. Patient is here complaining of cough/sickness. States he's been feeling under the weather since February. Endorses persistent cough for multiple weeks. Denies any hemoptysis. No fevers.     ROS per Resident    Lab Results   Component Value Date    WBC 11.0 (H) 10/16/2020    HGB 16.5 10/16/2020    HCT 46.0 10/16/2020    MCV 86.1 10/16/2020     10/16/2020     Lab Results   Component Value Date     (L) 10/16/2020    K 3.9 10/16/2020     10/16/2020    CO2 22 (L) 10/16/2020    BUN 10 10/16/2020    CREATININE 0.8 10/16/2020    GLUCOSE 197 (H) 10/16/2020    CALCIUM 9.4 10/16/2020    PROT 7.2 10/16/2020    LABALBU 4.1 10/16/2020    BILITOT 0.3 10/16/2020    ALKPHOS 78 10/16/2020    AST 13 10/16/2020    ALT 11 10/16/2020    LABGLOM >90 10/16/2020     Lab Results   Component Value Date    LABA1C 7.1 (H) 08/19/2018     No results found for: EAG  No results found for: Maurice Cadet[place]reece  Lab Results   Component Value Date    TSH 1.450 08/20/2018     Lab Results   Component Value Date    CHOL 191 10/22/2014     Lab Results   Component Value Date    TRIG 191 10/22/2014     Lab Results   Component Value Date    HDL 42 10/22/2014     Lab Results   Component Value Date    LDLCALC 111 10/22/2014     No results found for: LABVLDL, VLDL  No results found for: CHOLHDLRATIO  No results found for: PSA, PSADIA    Health Maintenance Due   Topic Date Due    COVID-19 Vaccine (1) Never done    Diabetic foot exam  Never done    HIV screen  Never done    Diabetic Alb to Cr ratio (uACR) test  Never done    Diabetic retinal exam  Never done    Hepatitis C screen  Never done    Hepatitis B vaccine (1 of 3 - Risk 3-dose series) Never done    Colorectal Cancer Screen  Never done Lipids  10/22/2015    Shingles vaccine (1 of 2) Never done    A1C test (Diabetic or Prediabetic)  08/19/2019    GFR test (Diabetes, CKD 3-4, OR last GFR 15-59)  10/16/2021    Flu vaccine (1) 08/01/2022         Physical Exam per Resident       ICD-10-CM    1. Viral URI  J06.9 benzonatate (TESSALON) 200 MG capsule     XR CHEST STANDARD (2 VW)      2. Type 2 diabetes mellitus with hyperglycemia, without long-term current use of insulin (HCC)  E11.65 Comprehensive Metabolic Panel     CBC with Auto Differential     CANCELED: Microalbumin / Creatinine Urine Ratio     CANCELED: Hemoglobin A1C      3. Dyslipidemia  E78.5 CANCELED: Lipid, Fasting      4. Other fatigue  R53.83 Vitamin B12 & Folate     CANCELED: TSH with Reflex      5.  Fever, unspecified fever cause  R50.9 acetaminophen (TYLENOL) 500 MG tablet          Plan  I participated in the discussion and care of this patient   Will treat symptoms with tessalon and tylenol  CXR for persistent cough

## 2023-03-13 NOTE — PATIENT INSTRUCTIONS
Thank you   Thank you for trusting us with your healthcare needs. You may receive a survey regarding today's visit. It would help us out if you would take a few moments to provide your feedback. We value your input. Please bring in ALL medications BOTTLES, including inhalers, herbal supplements, over the counter, prescribed & non-prescribed medicine. The office would like actual medication bottles and a list.   Please note our OFFICE POLICIES:   Prior to getting your labs drawn, please check with your insurance company for benefits and eligibility of lab services. Often, insurance companies cover certain tests for preventative visits only. It is patient's responsibility to see what is covered. We are unable to change a diagnosis after the test has been performed. Lab orders will not be re-printed. Please hold onto your original lab orders and take them to your lab to be completed. If you no show your scheduled appointment three times, you will be dismissed from this practice. Reschedules must be completed 24 hours prior to your schedule appointment. If the list below has been completed, PLEASE FAX RECORDS TO OUR OFFICE @ [phone].  Once the records have been received we will update your records at our office:  Health Maintenance Due   Topic Date Due    COVID-19 Vaccine (1) Never done    Diabetic foot exam  Never done    HIV screen  Never done    Diabetic Alb to Cr ratio (uACR) test  Never done    Diabetic retinal exam  Never done    Hepatitis C screen  Never done    Hepatitis B vaccine (1 of 3 - Risk 3-dose series) Never done    Colorectal Cancer Screen  Never done    Lipids  10/22/2015    Shingles vaccine (1 of 2) Never done    A1C test (Diabetic or Prediabetic)  08/19/2019    GFR test (Diabetes, CKD 3-4, OR last GFR 15-59)  10/16/2021    Flu vaccine (1) 08/01/2022

## 2023-03-15 LAB
FOLATE SERPL-MCNC: 5.1 NG/ML (ref 4.8–24.2)
VIT B12 SERPL-MCNC: 375 PG/ML (ref 211–911)

## 2023-03-16 ENCOUNTER — TELEPHONE (OUTPATIENT)
Dept: FAMILY MEDICINE CLINIC | Age: 53
End: 2023-03-16

## 2023-03-16 DIAGNOSIS — E53.8 LOW SERUM VITAMIN B12: Primary | ICD-10-CM

## 2023-03-16 DIAGNOSIS — E53.8 LOW FOLATE: ICD-10-CM

## 2023-03-16 RX ORDER — THIAMINE HCL 100 MG
1 TABLET ORAL DAILY
Qty: 60 TABLET | Refills: 3 | Status: SHIPPED | OUTPATIENT
Start: 2023-03-16

## 2023-03-16 NOTE — TELEPHONE ENCOUNTER
Patient agreeable to starting vitamin supplementation. Can we let him know that the script has been sent to [facility]? Thanks.

## 2023-03-21 ENCOUNTER — TELEPHONE (OUTPATIENT)
Dept: FAMILY MEDICINE CLINIC | Age: 53
End: 2023-03-21

## 2023-03-21 NOTE — TELEPHONE ENCOUNTER
----- Message from Drake Higgins MD sent at 3/20/2023  8:12 PM EDT -----  No sign of pneumonia on chest xray.

## 2023-03-21 NOTE — TELEPHONE ENCOUNTER
I would encourage the patient to be seen in the office or to urgent care/ED for the cough if it is still ongoing. Thanks!

## 2023-03-21 NOTE — TELEPHONE ENCOUNTER
Discussed results with patient. He has a pretty deep/barking cough. He is wanting to see if he could get something for his cough, some cough syrup maybe? Thank you. Please advise.

## 2023-08-22 DIAGNOSIS — K21.9 GASTROESOPHAGEAL REFLUX DISEASE, UNSPECIFIED WHETHER ESOPHAGITIS PRESENT: ICD-10-CM

## 2023-08-22 RX ORDER — OMEPRAZOLE 40 MG/1
CAPSULE, DELAYED RELEASE ORAL
Qty: 30 CAPSULE | Refills: 3 | Status: SHIPPED | OUTPATIENT
Start: 2023-08-22

## 2023-08-22 NOTE — TELEPHONE ENCOUNTER
Julius Mode called requesting a refill on the following medications:  Requested Prescriptions     Pending Prescriptions Disp Refills    omeprazole (PRILOSEC) 40 MG delayed release capsule [Pharmacy Med Name: OMEPRAZOLE DR 40 MG CAPSULE] 30 capsule 3     Sig: take 1 capsule by mouth once daily       Date of last visit: 3/13/2023  Date of next visit (if applicable):Visit date not found  Date of last refill: 2/6/23  [facility]: [facility]    Rx pending #30/3      Thanks,  Nehal Peñaloza LPN

## 2025-07-08 ENCOUNTER — OFFICE VISIT (OUTPATIENT)
Dept: FAMILY MEDICINE CLINIC | Age: 55
End: 2025-07-08
Payer: COMMERCIAL

## 2025-07-08 VITALS
TEMPERATURE: 98.3 F | HEIGHT: 71 IN | DIASTOLIC BLOOD PRESSURE: 72 MMHG | BODY MASS INDEX: 26.71 KG/M2 | RESPIRATION RATE: 16 BRPM | SYSTOLIC BLOOD PRESSURE: 132 MMHG | OXYGEN SATURATION: 98 % | HEART RATE: 93 BPM | WEIGHT: 190.8 LBS

## 2025-07-08 DIAGNOSIS — Z56.89: ICD-10-CM

## 2025-07-08 DIAGNOSIS — Z00.00 HEALTHCARE MAINTENANCE: ICD-10-CM

## 2025-07-08 DIAGNOSIS — I25.10 CORONARY ARTERY DISEASE INVOLVING NATIVE CORONARY ARTERY OF NATIVE HEART WITHOUT ANGINA PECTORIS: Primary | ICD-10-CM

## 2025-07-08 DIAGNOSIS — K21.9 GASTROESOPHAGEAL REFLUX DISEASE, UNSPECIFIED WHETHER ESOPHAGITIS PRESENT: ICD-10-CM

## 2025-07-08 PROCEDURE — G8427 DOCREV CUR MEDS BY ELIG CLIN: HCPCS

## 2025-07-08 PROCEDURE — G8419 CALC BMI OUT NRM PARAM NOF/U: HCPCS

## 2025-07-08 PROCEDURE — 4004F PT TOBACCO SCREEN RCVD TLK: CPT

## 2025-07-08 PROCEDURE — 3017F COLORECTAL CA SCREEN DOC REV: CPT

## 2025-07-08 PROCEDURE — 99213 OFFICE O/P EST LOW 20 MIN: CPT

## 2025-07-08 RX ORDER — OMEPRAZOLE 40 MG/1
40 CAPSULE, DELAYED RELEASE ORAL DAILY
Qty: 30 CAPSULE | Refills: 1 | Status: SHIPPED | OUTPATIENT
Start: 2025-07-08

## 2025-07-08 RX ORDER — OMEPRAZOLE 40 MG/1
40 CAPSULE, DELAYED RELEASE ORAL DAILY
Qty: 30 CAPSULE | Refills: 1 | Status: SHIPPED | OUTPATIENT
Start: 2025-07-08 | End: 2025-07-08

## 2025-07-08 SDOH — ECONOMIC STABILITY: FOOD INSECURITY: WITHIN THE PAST 12 MONTHS, THE FOOD YOU BOUGHT JUST DIDN'T LAST AND YOU DIDN'T HAVE MONEY TO GET MORE.: OFTEN TRUE

## 2025-07-08 SDOH — ECONOMIC STABILITY: FOOD INSECURITY: WITHIN THE PAST 12 MONTHS, YOU WORRIED THAT YOUR FOOD WOULD RUN OUT BEFORE YOU GOT MONEY TO BUY MORE.: OFTEN TRUE

## 2025-07-08 ASSESSMENT — PATIENT HEALTH QUESTIONNAIRE - PHQ9
1. LITTLE INTEREST OR PLEASURE IN DOING THINGS: NOT AT ALL
SUM OF ALL RESPONSES TO PHQ QUESTIONS 1-9: 1
2. FEELING DOWN, DEPRESSED OR HOPELESS: SEVERAL DAYS

## 2025-07-08 NOTE — PROGRESS NOTES
Health Maintenance Due   Topic Date Due    Diabetic foot exam  Never done    Depression Screen  Never done    HIV screen  Never done    Diabetic Alb to Cr ratio (uACR) test  Never done    Diabetic retinal exam  Never done    Hepatitis C screen  Never done    Hepatitis B vaccine (1 of 3 - 19+ 3-dose series) Never done    Colorectal Cancer Screen  Never done    Lipids  10/22/2015    Shingles vaccine (1 of 2) Never done    A1C test (Diabetic or Prediabetic)  08/19/2019    Pneumococcal 50+ years Vaccine (2 of 2 - PCV) 08/17/2021    GFR test (Diabetes, CKD 3-4, OR last GFR 15-59)  03/13/2024    COVID-19 Vaccine (1 - 2024-25 season) Never done       
restrictions but will also encourage cardiology follow up. Labs ordered as above. Follow up in 1 month for chronic condition/lab review.     Health Maintenance Due   Topic Date Due    Diabetic foot exam  Never done    HIV screen  Never done    Diabetic Alb to Cr ratio (uACR) test  Never done    Diabetic retinal exam  Never done    Hepatitis C screen  Never done    Hepatitis B vaccine (1 of 3 - 19+ 3-dose series) Never done    Colorectal Cancer Screen  Never done    Lipids  10/22/2015    Shingles vaccine (1 of 2) Never done    A1C test (Diabetic or Prediabetic)  08/19/2019    Pneumococcal 50+ years Vaccine (2 of 2 - PCV) 08/17/2021    GFR test (Diabetes, CKD 3-4, OR last GFR 15-59)  03/13/2024    COVID-19 Vaccine (1 - 2024-25 season) Never done       Attending Physician Statement  I have discussed the case, including pertinent history and exam findings with the resident.  I agree with the documented assessment and plan as documented by the resident.        Sabine Salgado DO 7/9/2025 1:43 PM    
   HIV screen  Never done    Diabetic Alb to Cr ratio (uACR) test  Never done    Diabetic retinal exam  Never done    Hepatitis C screen  Never done    Hepatitis B vaccine (1 of 3 - 19+ 3-dose series) Never done    Colorectal Cancer Screen  Never done    Lipids  10/22/2015    Shingles vaccine (1 of 2) Never done    A1C test (Diabetic or Prediabetic)  08/19/2019    Pneumococcal 50+ years Vaccine (2 of 2 - PCV) 08/17/2021    GFR test (Diabetes, CKD 3-4, OR last GFR 15-59)  03/13/2024    COVID-19 Vaccine (1 - 2024-25 season) Never done       Food Insecurity: Food Insecurity Present (7/8/2025)    Hunger Vital Sign     Worried About Running Out of Food in the Last Year: Often true     Ran Out of Food in the Last Year: Often true       Assessment / Plan:   1. Fit for work with restrictions    2. Gastroesophageal reflux disease, unspecified whether esophagitis present  - CBC with Auto Differential; Future  - Comprehensive Metabolic Panel; Future  - Hemoglobin A1C; Future  - Lipid Panel; Future  - TSH reflex to FT4; Future  - omeprazole (PRILOSEC) 40 MG delayed release capsule; Take 1 capsule by mouth daily  Dispense: 30 capsule; Refill: 1    2. Healthcare maintenance  - CBC with Auto Differential; Future  - Comprehensive Metabolic Panel; Future  - Hemoglobin A1C; Future  - Lipid Panel; Future  - TSH reflex to FT4; Future    A/P:  Work letter given to avoid heavy lifting/strenuous activity, given reported prior history of MI/cardiac issues?.  Patient strongly advised to follow-up with cardiology regularly for chronic management of cardiac issues.  Also advised to seek specialist care for ongoing weight restrictions at work/future letters needed.  Short-term refill given for Prilosec.  Routine labs ordered as above, advised to complete prior to next office visit     Return in about 4 weeks (around 8/5/2025) for 4-6 weeks to est care./Further medication refill.  Patient strongly advised to follow-up with cardiology regularly

## 2025-07-08 NOTE — PATIENT INSTRUCTIONS
wait with them.  Phone number: [phone] which   [employer]:  What they offer: Transportation to the [place] area.  Phone number: [phone]  [facility]:  What they offer: Transportation to medical and non-medical individuals in the community. Accommodations for both ambulatory and wheelchair bound individuals.    Phone number: [phone]  [employer]:  What they offer: Transportation for those 60 years of age and older. Vans are wheelchair accessible. Transportation provided daily in [place] and to surrounding area for essential appointments such as medical, banking, grocery shopping, meal sites, , and senior center. Available in [place], [place] and [place] for medical appointment.   Cost: Suggested contribution of $5.00 per round trip to [place] and $12.00 per round trip for out-of-town appointments.  Phone number:  [phone]  [employer]:  Phone number: [phone]  [employer]:  Phone number: 118.600.3380  Website: https://www.[employer]/bus/[place] [employer]:  What they offer: Emergency and non-emergency transportation for patients in [place]. Available for transportation to hospitals, medical centers, and health centers.   Phone number: [phone]  [employer]/ [employer]/:  What they offer: Transportation to appointments, shopping and visiting friends.  Cost: Adults $1.00, 65+ &disabled with a RTA card $0.50, Medicare $0.50, Youth 6 to 18 years old $0.75, under 6 free.   Phone Number: [phone]  Website: https://wwwThe [employer]/The [employer]   [employer]:  What they offer: Transportation throughout the state of [place] and [place]. Available for Ambulatory and wheelchair bound services.  Cost: [place] Medicaid and Private pay   Phone Number: [phone]  Website: https://www.[employer]-transit.html   [facility]:  What they offer: Transportation for [place]

## 2025-07-11 ENCOUNTER — HOSPITAL ENCOUNTER (EMERGENCY)
Age: 55
Discharge: HOME | End: 2025-07-11
Payer: COMMERCIAL

## 2025-07-11 VITALS
WEIGHT: 190 LBS | DIASTOLIC BLOOD PRESSURE: 70 MMHG | SYSTOLIC BLOOD PRESSURE: 134 MMHG | RESPIRATION RATE: 16 BRPM | HEART RATE: 94 BPM | BODY MASS INDEX: 26.6 KG/M2 | HEIGHT: 71 IN | TEMPERATURE: 97.9 F | OXYGEN SATURATION: 98 %

## 2025-07-11 DIAGNOSIS — R19.7 DIARRHEA, UNSPECIFIED TYPE: ICD-10-CM

## 2025-07-11 DIAGNOSIS — R11.0 NAUSEA: ICD-10-CM

## 2025-07-11 DIAGNOSIS — R10.84 GENERALIZED ABDOMINAL CRAMPING: Primary | ICD-10-CM

## 2025-07-11 PROCEDURE — 99203 OFFICE O/P NEW LOW 30 MIN: CPT | Performed by: NURSE PRACTITIONER

## 2025-07-11 PROCEDURE — 99213 OFFICE O/P EST LOW 20 MIN: CPT

## 2025-07-11 RX ORDER — DICYCLOMINE HYDROCHLORIDE 10 MG/1
10 CAPSULE ORAL
Qty: 20 CAPSULE | Refills: 0 | Status: SHIPPED | OUTPATIENT
Start: 2025-07-11 | End: 2025-07-16

## 2025-07-11 RX ORDER — ONDANSETRON 4 MG/1
4 TABLET, ORALLY DISINTEGRATING ORAL 3 TIMES DAILY PRN
Qty: 15 TABLET | Refills: 0 | Status: SHIPPED | OUTPATIENT
Start: 2025-07-11 | End: 2025-07-16

## 2025-07-11 ASSESSMENT — PAIN SCALES - GENERAL: PAINLEVEL_OUTOF10: 9

## 2025-07-11 ASSESSMENT — ENCOUNTER SYMPTOMS
DIARRHEA: 1
ABDOMINAL PAIN: 1
VOMITING: 0
NAUSEA: 1

## 2025-07-11 ASSESSMENT — PAIN - FUNCTIONAL ASSESSMENT
PAIN_FUNCTIONAL_ASSESSMENT: PREVENTS OR INTERFERES SOME ACTIVE ACTIVITIES AND ADLS
PAIN_FUNCTIONAL_ASSESSMENT: 0-10

## 2025-07-11 ASSESSMENT — PAIN DESCRIPTION - LOCATION: LOCATION: ABDOMEN

## 2025-07-11 ASSESSMENT — PAIN DESCRIPTION - DESCRIPTORS: DESCRIPTORS: CRAMPING

## 2025-07-11 ASSESSMENT — PAIN DESCRIPTION - PAIN TYPE: TYPE: ACUTE PAIN

## 2025-07-11 NOTE — ED PROVIDER NOTES
[facility] URGENT CARE  Urgent Care Encounter       CHIEF COMPLAINT       Chief Complaint   Patient presents with    Diarrhea     Work note       Nurses Notes reviewed and I agree except as noted in the HPI.  HISTORY OF PRESENT ILLNESS   Johan Quiñonez is a 55 y.o. male who presents to urgent care with c/o abd cramping.  Pt states symptoms started last night after eating a sub from [employer].  Pt states he has had nausea, diarrhea, and abd cramping.  Pt denies vomiting.  Pt states he tried to drink coffee this morning upset stomach and has not had anything to eat or drink since.  Pt states he had to miss work this morning.  Pt denies taking any daily medications at this time.     The history is provided by the patient.       REVIEW OF SYSTEMS     Review of Systems   Gastrointestinal:  Positive for abdominal pain (cramping), diarrhea and nausea. Negative for vomiting.       PAST MEDICAL HISTORY         Diagnosis Date    Allergic rhinitis     Anxiety     Arthritis     Asthma     CAD (coronary artery disease)     Depression     Diabetes mellitus (HCC)     GERD (gastroesophageal reflux disease)     Hyperlipidemia     Hypertension     Insomnia     Ischemic cardiomyopathy 10/24/2014    Pneumonia     S/P ICD (internal cardiac defibrillator) procedure: 10/24/2014: Medtronic Single Chamber 10/24/2014    10/24/2014: Medtronic Single Chamber. Dr. Dennis       SURGICALHISTORY     Patient  has a past surgical history that includes Coronary angioplasty with stent (2002?); pacemaker placement; cyst removal (Right, 06/21/2017); Elbow surgery (Left); Cosmetic surgery (Right, 2017); and Toe amputation.    CURRENT MEDICATIONS       Previous Medications    ACETAMINOPHEN (TYLENOL) 500 MG TABLET    Take 2 tablets by mouth 3 times daily as needed for Pain or Fever    ALBUTEROL SULFATE  (90 BASE) MCG/ACT INHALER    Inhale 2 puffs into the lungs every 6 hours as needed for Wheezing    ASPIRIN EC 81 MG EC TABLET    Take 1

## 2025-07-11 NOTE — DISCHARGE INSTRUCTIONS
Zofran as needed for nausea    Bentyl as needed for abdomen cramping    Increase oral intake including water, gatorade, electrolyte supplements    Follow up with PCP as needed    Go to the ER if symptoms do not improve or worsen including worsening abdomen pain, vomiting, fevers

## 2025-07-11 NOTE — ED TRIAGE NOTES
Patient ambulated to room with complaint of diarrhea and abdominal cramping. States he had a [employer] sub last night and soon after starting having nausea and diarrhea.